# Patient Record
Sex: FEMALE | Race: WHITE
[De-identification: names, ages, dates, MRNs, and addresses within clinical notes are randomized per-mention and may not be internally consistent; named-entity substitution may affect disease eponyms.]

---

## 2018-06-12 ENCOUNTER — HOSPITAL ENCOUNTER (INPATIENT)
Dept: HOSPITAL 41 - JD.ED | Age: 83
LOS: 7 days | Discharge: HOME HEALTH | DRG: 193 | End: 2018-06-19
Payer: MEDICARE

## 2018-06-12 DIAGNOSIS — Z93.3: ICD-10-CM

## 2018-06-12 DIAGNOSIS — Z79.01: ICD-10-CM

## 2018-06-12 DIAGNOSIS — Z79.82: ICD-10-CM

## 2018-06-12 DIAGNOSIS — M19.90: ICD-10-CM

## 2018-06-12 DIAGNOSIS — E03.9: ICD-10-CM

## 2018-06-12 DIAGNOSIS — Z85.048: ICD-10-CM

## 2018-06-12 DIAGNOSIS — I11.0: ICD-10-CM

## 2018-06-12 DIAGNOSIS — Z87.01: ICD-10-CM

## 2018-06-12 DIAGNOSIS — I50.9: ICD-10-CM

## 2018-06-12 DIAGNOSIS — Z86.73: ICD-10-CM

## 2018-06-12 DIAGNOSIS — I50.32: ICD-10-CM

## 2018-06-12 DIAGNOSIS — Z88.8: ICD-10-CM

## 2018-06-12 DIAGNOSIS — J18.9: ICD-10-CM

## 2018-06-12 DIAGNOSIS — Z79.899: ICD-10-CM

## 2018-06-12 DIAGNOSIS — J44.0: ICD-10-CM

## 2018-06-12 DIAGNOSIS — R79.1: ICD-10-CM

## 2018-06-12 DIAGNOSIS — I48.2: ICD-10-CM

## 2018-06-12 DIAGNOSIS — Z77.22: ICD-10-CM

## 2018-06-12 DIAGNOSIS — Z87.891: ICD-10-CM

## 2018-06-12 DIAGNOSIS — J96.01: ICD-10-CM

## 2018-06-12 DIAGNOSIS — Z88.0: ICD-10-CM

## 2018-06-12 DIAGNOSIS — H40.9: ICD-10-CM

## 2018-06-12 DIAGNOSIS — I35.1: ICD-10-CM

## 2018-06-12 DIAGNOSIS — J12.3: Primary | ICD-10-CM

## 2018-06-12 DIAGNOSIS — Z87.440: ICD-10-CM

## 2018-06-12 PROCEDURE — 86140 C-REACTIVE PROTEIN: CPT

## 2018-06-12 PROCEDURE — 87040 BLOOD CULTURE FOR BACTERIA: CPT

## 2018-06-12 PROCEDURE — 71045 X-RAY EXAM CHEST 1 VIEW: CPT

## 2018-06-12 PROCEDURE — 85027 COMPLETE CBC AUTOMATED: CPT

## 2018-06-12 PROCEDURE — 82803 BLOOD GASES ANY COMBINATION: CPT

## 2018-06-12 PROCEDURE — 96365 THER/PROPH/DIAG IV INF INIT: CPT

## 2018-06-12 PROCEDURE — 94640 AIRWAY INHALATION TREATMENT: CPT

## 2018-06-12 PROCEDURE — 85007 BL SMEAR W/DIFF WBC COUNT: CPT

## 2018-06-12 PROCEDURE — 85610 PROTHROMBIN TIME: CPT

## 2018-06-12 PROCEDURE — 36415 COLL VENOUS BLD VENIPUNCTURE: CPT

## 2018-06-12 PROCEDURE — 36600 WITHDRAWAL OF ARTERIAL BLOOD: CPT

## 2018-06-12 PROCEDURE — 93005 ELECTROCARDIOGRAM TRACING: CPT

## 2018-06-12 PROCEDURE — 87205 SMEAR GRAM STAIN: CPT

## 2018-06-12 PROCEDURE — 84484 ASSAY OF TROPONIN QUANT: CPT

## 2018-06-12 PROCEDURE — 99285 EMERGENCY DEPT VISIT HI MDM: CPT

## 2018-06-12 PROCEDURE — 80053 COMPREHEN METABOLIC PANEL: CPT

## 2018-06-12 PROCEDURE — 83880 ASSAY OF NATRIURETIC PEPTIDE: CPT

## 2018-06-12 RX ADMIN — METOROPROLOL TARTRATE PRN MG: 5 INJECTION, SOLUTION INTRAVENOUS at 21:16

## 2018-06-12 RX ADMIN — POTASSIUM CHLORIDE SCH MEQ: 1500 TABLET, EXTENDED RELEASE ORAL at 20:54

## 2018-06-12 NOTE — PCM.HP
<Katherine Wyman - Last Filed: 06/12/18 19:26>





H&P History of Present Illness





- General


Date of Service: 06/12/18


Admit Problem/Dx: 


 Admission Diagnosis/Problem





Admission Diagnosis/Problem      Pneumonia








Source of Information: Patient, Family, Provider





- History of Present Illness


Initial Comments - Free Text/Narative: 





This is an 88 y/o female with PMHx significant for chronic afib, COPD, CHF, 

rheumatic fever as toddler, pneumonia in the past, glaucoma, recurrent UTI, 

hypothyroidism, OA, and rectal cancer with surgical intervention for which she 

now has a colostomy who comes in for worsening cough that is non productive and 

shortness of breath for the past 2 days. She was admitted from the ED. Patient 

reports subjective fever, chills, shortness of breath, wheezing, cough, chronic 

peripheral edema, difficulty walking, and weakness. She denies chest pain, 

throat pain, sputum production, abdominal pain, N/V, constipation, diarrhea, 

headache. 





Her work-up in the ED showed CBC remarkable for platelet count 151. CMP 

remarkable for Cl 108, anion gap 16.1, BUN 20, Cr 1.2, eGFR 42. CRP elevated at 

9.4. ABG performed showed pCO2 32.3, pO2 53.0, HCO3 19.7. BNP was 3935. ED also 

ordered BC x 2 which are pending. CXR performed in ED showed right lower base 

infiltrate and possible infiltrate vs atelectasis in the left base as well as 

mild pulmonary congestion. Levaquin 750 mg IV initiated in ED. 





Patient reports that she has been feeling short of breath and coughing for the 

past 2 days. Daughter is present with patient and reports that they have tried 

Vicks rub, tumeric and ginger tea with honey with no relief. They have not 

tried OTC products. Patient has been using 2 pillows at night but denies any 

PND. Daughter reports that patient has been hospitalized for pneumonia in the 

past but this has been greater than 1 year ago. Patient states she has b/l 

pedal edema but wears KURT hose and this is well controlled. Patient has a nurse 

that comes to her home 2 times per week to take her BP and O2 sat. Patient 

states that her usual O2 sat is 99%. She does not use home O2. Daughter reports 

that patient is independent at home in [place], but she does use a walker 

and cane. 





Daughter also reports that patient just finished treatment for a UTI recently. 

Daughter reports she was treated with cipro and yesterday was the last dose of 

this. 





She is subsequently admitted to the Med/Surg with Tele. She is a former smoker 

and was exposed to secondhand smoke. Code status is CPR only. PCP is Dr. Nereyda Rodriguez in [place], [place].  





   





- Related Data


Allergies/Adverse Reactions: 


 Allergies











Allergy/AdvReac Type Severity Reaction Status Date / Time


 


NSAIDS (Non-Steroidal Allergy  Other Verified 06/12/18 13:53





Anti-Inflamma     


 


Penicillins Allergy  Other Verified 06/12/18 13:53











Home Medications: 


 Home Meds





Acetaminophen/Diphenhydramine [Tylenol Pm Ex-Strength Caplet] 500 mg PO BID 06/ 12/18 [History]


Aspirin 81 mg PO DAILY 06/12/18 [History]


Benazepril HCl [Lotensin] 40 mg PO DAILY 06/12/18 [History]


Fish Oil/DHA/EPA [Fish Oil 1,200 MG] 300 mg PO DAILY 06/12/18 [History]


Furosemide [Lasix] 40 mg PO DAILY 06/12/18 [History]


Lactobacillus Acidophilus [Probiotic] 1 each PO DAILY 06/12/18 [History]


Levothyroxine [Synthroid] 88 mcg PO ACBREAKFAST 06/12/18 [History]


Melatonin [Melatin] 3 mg PO BEDTIME 06/12/18 [History]


Metoprolol Tartrate 50 mg PO BID 06/12/18 [History]


Mirtazapine 7.5 mg PO DAILY 06/12/18 [History]


Multivitamin W-Minerals/Lutein [Vision Plus Lutein Vitamin] 1 each PO BID 06/12/ 18 [History]


Potassium Chloride 20 meq PO BID 06/12/18 [History]


Tiotropium Br/Olodaterol HCl [Stiolto Respimat Inhal Spray] 4 gm IH DAILY 06/12/ 18 [History]


Warfarin [Coumadin] 1 mg PO TU 06/12/18 [History]


Warfarin [Coumadin] 2 mg PO ASDIRECTED 06/12/18 [History]


amLODIPine Besylate [Amlodipine Besylate] 10 mg PO DAILY 06/12/18 [History]











Past Medical History


HEENT History: Reports: Glaucoma, Impaired Vision, Macular Degeneration


Cardiovascular History: Reports: Afib, Heart Failure, Hypertension


Respiratory History: Reports: COPD, SOB, Other (See Below)


Other Respiratory History: borderline emphysema


Genitourinary History: Reports: UTI, Recurrent


Other Musculoskeletal History: uses cane


Neurological History: Reports: Other (See Below)


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

"
Endocrine/Metabolic History: Reports: Hypothyroidism


Hematologic History: Reports: Other (See Below)


Other Hematologic History: blood thinners





- Past Surgical History


GI Surgical History: Reports: Appendectomy, Colostomy, Other (See Below)


Other GI Surgeries/Procedures: hx rectal cancer with surgical intervention





Social & Family History





- Tobacco Use


Smoking Status *Q: Former Smoker


Used Tobacco, but Quit: No





- Caffeine Use


Caffeine Use: Reports: Coffee, Soda





- Recreational Drug Use


Recreational Drug Use: No





H&P Review of Systems





- Review of Systems:


Review Of Systems: See Below


General: Reports: Fever (subjective ), Weakness, Decreased Appetite


HEENT: Reports: No Symptoms.  Denies: Headaches


Pulmonary: Reports: Shortness of Breath, Cough.  Denies: Sputum, Hemoptysis


Cardiovascular: Reports: No Symptoms, Dyspnea on Exertion, Edema (trace, wears 

KURT hose regularly ).  Denies: Chest Pain, Palpitations, Lightheadedness


Gastrointestinal: Reports: No Symptoms.  Denies: Abdominal Pain, Constipation, 

Diarrhea, Hematochezia, Melena, Nausea


Genitourinary: Reports: No Symptoms.  Denies: Dysuria, Frequency, Burning


Musculoskeletal: Reports: No Symptoms, Joint Pain (chronic left knee pain with 

OA )


Skin: Reports: No Symptoms.  Denies: Cyanosis


Psychiatric: Reports: No Symptoms.  Denies: Confusion, Depression


Neurological: Reports: No Symptoms, Weakness (generalized ).  Denies: Confusion

, Dizziness, Headache


Hematologic/Lymphatic: Reports: No Symptoms


Immunologic: Reports: No Symptoms





Exam





- Exam


Exam: See Below





- Vital Signs


Vital Signs: 


 Last Vital Signs











Temp  98.5 F   06/12/18 13:38


 


Pulse  92   06/12/18 13:38


 


Resp  22 H  06/12/18 13:38


 


BP  156/74 H  06/12/18 13:38


 


Pulse Ox  90 L  06/12/18 16:53











Weight: 65.771 kg





- Exam


Quality Assessment: Supplemental Oxygen, DVT Prophylaxis (KURT hose )


General: Alert, Oriented, Cooperative, Mild Distress


HEENT: EOMI, Mucosa Moist & Pink, Pupils Equal


Neck: Supple, Full Range of Motion


Lungs: Crackles (b/l ), Rales (b/l ), Other (Increased respiratory effort )


Cardiovascular: Regular Rate, Regular Rhythm


GI/Abdominal Exam: Normal Bowel Sounds, Soft, Non-Tender, No Distention


 (Female) Exam: Deferred


Rectal (Female) Exam: Deferred


Back Exam: Normal Inspection, Decreased Range of Motion


Extremities: Normal Inspection, Normal Capillary Refill, Pedal Edema (trace ), 

Limited Range of Motion


Peripheral Pulses: 1+: Radial (L), Radial (R), Posterior Tibial (L), Posterior 

Tibial (R), Dorsalis Pedis (L), Dorsalis Pedis (R)


Skin: Warm, Dry, Intact


Neurological: Cranial Nerves Intact, Strength Equal Bilateral (weakness present 

)


Neuro Extensive - Mental Status: Alert, Oriented x3, Normal Mood/Affect, Normal 

Cognition, Memory Intact


Neuro Extensive - Motor, Sensory, Reflexes: CN II-XII Intact (grossly )


Psychiatric: Alert, Normal Affect, Normal Mood





- Patient Data


Lab Results Last 24 hrs: 


 Laboratory Results - last 24 hr











  06/12/18 06/12/18 06/12/18 Range/Units





  14:34 14:50 14:50 


 


WBC   6.69   (3.98-10.04)  K/mm3


 


RBC   4.84   (3.98-5.22)  M/mm3


 


Hgb   14.6   (11.2-15.7)  gm/L


 


Hct   44.1   (34.1-44.9)  %


 


MCV   91.1   (79.4-94.8)  fl


 


MCH   30.2   (25.6-32.2)  pg


 


MCHC   33.1   (32.2-35.5)  g/dl


 


RDW Std Deviation   48.2 H   (36.4-46.3)  fL


 


Plt Count   151 L   (182-369)  K/mm3


 


MPV   10.4   (9.4-12.3)  fl


 


Neutrophils % (Manual)   66 H   (40-60)  %


 


Band Neutrophils %   2   (0-10)  %


 


Lymphocytes % (Manual)   28   (20-40)  %


 


Atypical Lymphs %   0   %


 


Monocytes % (Manual)   4   (2-10)  %


 


Eosinophils % (Manual)   0 L   (0.7-5.8)  %


 


Basophils % (Manual)   0 L   (0.1-1.2)  


 


Platelet Estimate   Adequate   


 


Poikilocytosis   1+ slight   


 


Ovalocytes   1+ slight   


 


RBC Morph Comment   Not Reportable   


 


PT     (9.5-12.1)  SECONDS


 


INR     


 


Puncture Site  Rt radial    


 


ABG pH  7.40    (7.35-7.45)  


 


ABG pCO2  32.3 L    (35.0-45.0)  mmHg


 


ABG pO2  53.0 L    (80.0-100.0)  mmHg


 


ABG HCO3  19.7 L    (22.0-26.0)  meq/L


 


ABG O2 Saturation  80.8 L    (96.0-97.0)  %


 


ABG Base Excess  -3.6 L    (-2-2.0)  


 


Efrem Test  Positive    


 


A-a Gradient  112    mmHg


 


O2 Delivery Device  Nasal cannula    


 


Oxygen Flow Rate  3.0    


 


FiO2  32.00    (21..00)  %


 


Sodium    142  (136-145)  mEq/L


 


Potassium    4.1  (3.5-5.1)  mEq/L


 


Chloride    108 H  ()  mEq/L


 


Carbon Dioxide    22  (21-32)  mEq/L


 


Anion Gap    16.1 H  (5-15)  


 


BUN    20 H  (7-18)  mg/dL


 


Creatinine    1.2 H  (0.55-1.02)  mg/dL


 


Est Cr Clr Drug Dosing    30.92  mL/min


 


Estimated GFR (MDRD)    42  (>60)  mL/min


 


BUN/Creatinine Ratio    16.7  (14-18)  


 


Glucose    107  ()  mg/dL


 


Calcium    8.8  (8.5-10.1)  mg/dL


 


Total Bilirubin    0.6  (0.2-1.0)  mg/dL


 


AST    36  (15-37)  U/L


 


ALT    15  (14-59)  U/L


 


Alkaline Phosphatase    79  ()  U/L


 


Troponin I    < 0.017  (0.00-0.056)  ng/mL


 


C-Reactive Protein    9.4 H*  (<1.0)  mg/dL


 


NT-Pro-B Natriuret Pep     (0-450)  pg/mL


 


Total Protein    7.1  (6.4-8.2)  g/dl


 


Albumin    3.1 L  (3.4-5.0)  g/dl


 


Globulin    4.0  gm/dL


 


Albumin/Globulin Ratio    0.8 L  (1-2)  














  06/12/18 06/12/18 Range/Units





  14:50 15:30 


 


WBC    (3.98-10.04)  K/mm3


 


RBC    (3.98-5.22)  M/mm3


 


Hgb    (11.2-15.7)  gm/L


 


Hct    (34.1-44.9)  %


 


MCV    (79.4-94.8)  fl


 


MCH    (25.6-32.2)  pg


 


MCHC    (32.2-35.5)  g/dl


 


RDW Std Deviation    (36.4-46.3)  fL


 


Plt Count    (182-369)  K/mm3


 


MPV    (9.4-12.3)  fl


 


Neutrophils % (Manual)    (40-60)  %


 


Band Neutrophils %    (0-10)  %


 


Lymphocytes % (Manual)    (20-40)  %


 


Atypical Lymphs %    %


 


Monocytes % (Manual)    (2-10)  %


 


Eosinophils % (Manual)    (0.7-5.8)  %


 


Basophils % (Manual)    (0.1-1.2)  


 


Platelet Estimate    


 


Poikilocytosis    


 


Ovalocytes    


 


RBC Morph Comment    


 


PT   20.3 H  (9.5-12.1)  SECONDS


 


INR   1.89  


 


Puncture Site    


 


ABG pH    (7.35-7.45)  


 


ABG pCO2    (35.0-45.0)  mmHg


 


ABG pO2    (80.0-100.0)  mmHg


 


ABG HCO3    (22.0-26.0)  meq/L


 


ABG O2 Saturation    (96.0-97.0)  %


 


ABG Base Excess    (-2-2.0)  


 


Efrem Test    


 


A-a Gradient    mmHg


 


O2 Delivery Device    


 


Oxygen Flow Rate    


 


FiO2    (21..00)  %


 


Sodium    (136-145)  mEq/L


 


Potassium    (3.5-5.1)  mEq/L


 


Chloride    ()  mEq/L


 


Carbon Dioxide    (21-32)  mEq/L


 


Anion Gap    (5-15)  


 


BUN    (7-18)  mg/dL


 


Creatinine    (0.55-1.02)  mg/dL


 


Est Cr Clr Drug Dosing    mL/min


 


Estimated GFR (MDRD)    (>60)  mL/min


 


BUN/Creatinine Ratio    (14-18)  


 


Glucose    ()  mg/dL


 


Calcium    (8.5-10.1)  mg/dL


 


Total Bilirubin    (0.2-1.0)  mg/dL


 


AST    (15-37)  U/L


 


ALT    (14-59)  U/L


 


Alkaline Phosphatase    ()  U/L


 


Troponin I    (0.00-0.056)  ng/mL


 


C-Reactive Protein    (<1.0)  mg/dL


 


NT-Pro-B Natriuret Pep  3935 H   (0-450)  pg/mL


 


Total Protein    (6.4-8.2)  g/dl


 


Albumin    (3.4-5.0)  g/dl


 


Globulin    gm/dL


 


Albumin/Globulin Ratio    (1-2)  











Result Diagrams: 


 06/12/18 14:50





 06/12/18 14:50


Problem List Initiated/Reviewed/Updated: Yes


Orders Last 24hrs: 


 Active Orders 24 hr











 Category Date Time Status


 


 Patient Status [ADT] Routine ADT  06/12/18 17:21 Active


 


 EKG 12 Lead [EKG Documentation Completion] [RC] STAT Care  06/12/18 14:15 

Active


 


 Oxygen Therapy [RC] ASDIRECTED Care  06/12/18 14:16 Active


 


 Peripheral IV Care [RC] .AS DIRECTED Care  06/12/18 14:16 Active


 


 RT Aerosol Therapy [RC] ASDIRECTED Care  06/12/18 14:02 Active


 


 RT Aerosol Therapy [RC] ASDIRECTED Care  06/12/18 16:53 Active


 


 Chest 1V Frontal [CR] Stat Exams  06/12/18 14:15 Taken


 


 CULTURE BLOOD [BC] Stat Lab  06/12/18 14:50 Received


 


 CULTURE BLOOD [BC] Stat Lab  06/12/18 15:04 Received


 


 Sodium Chloride 0.9% [Saline Flush] Med  06/12/18 14:15 Active





 10 ml FLUSH ASDIRECTED PRN   


 


 Peripheral IV Insertion Pediatric [OM.PC] Routine Oth  06/12/18 14:15 Ordered








 Medication Orders





Sodium Chloride (Saline Flush)  10 ml FLUSH ASDIRECTED PRN


   PRN Reason: Keep Vein Open


   Last Admin: 06/12/18 15:05  Dose: 10 ml








Assessment/Plan Comment:: 





I/P: 





Acute:





Community Acquired Pneumonia 


   - Risk Factors: COPD, elderly, previous hospitalization for pneumonia 


   - CURB 65 Score is 2 points:  6.8-30% mortality with recommendation to 

consider inpatient or outpatient with close follow-up


   - PSI/PORT Score is 107:  8.2-9.3% mortality with hospitalization 

recommended based on risk  


   - She does not meet criteria for Sepsis/SIRS at this time 


   - currently afebrile and no leukocytosis --> does take Tylenol daily which 

could be affecting her temperature 


   - ED CXR 6/12/18 initial report:  right lower base infiltrate and possible 

infiltrate vs atelectasis in the left base as well as mild pulmonary congestion.


   - Sputum Cx, Mycoplasma pneumoniae Ag, Strep pneumoniae Ag and Respiratory 

Viral Panel


   - Patient reports some difficulty chewing/swallowing --> concern for 

possible asp pneumonia, will consult SLP and order soft diet texture for now 


   - IV Levaquin 750 mg IV daily given in ED, will change therapy to 

Azithromycin 500 mg IV day 1 then 250 mg IV x 4 days and cefotaxime 1 gm IV q 8 

hr as she has recently been on Cipro with last dose yesterday 


      -Patient reports rash with PCN previously but states that she has had 

PCNs since that episode and has not had any reactions 


   - Decongestant and Expectorant Q4H PRN 


   - Prednisone 40 mg po daily x 5 days 


   - Supplemental O2 and breathing treatments as needed 


   - Serial CXR as indicated and FV/IS as directed 


   - Recommend outpatient PFTs 





Heart Failure


   - BNP 3935 


   - Home meds include Benazepril 40 mg daily, furosemide 40 mg daily, 

metoprolol tartrate 50 mg BID


   - Patient and daughter do not think patient has ever had 2D echo --> will 

order 2D Echo; if EF =< 35%, consider addition of spironolactone 


   - Heart Healthy diet 


   - Dietitian consult 





Subtherapeutic INR


   - hx of afib


   - INR 1.89


   - she is on warfarin 1 mg q Tuesday and 2 mg daily 


   - Pharmacy to adjust warfarin 





Weakness


   - exacerbated by current illness


   - uses a walker and cane at home but is usually independent


   - PT/OT consult


 


Chronic: 


Afib


COPD


CHF


Rheumatic fever as toddler 


Hx/o Pneumonia in the past


Glaucoma


Recurrent UTI --> recent completion of treatment with last dose of cipro 

yesterday, will order UA  


Hypothyroidism


OA


Rectal cancer with surgical intervention for which she now has a colostomy





Plan: 


Admitted from ED to Med/Surg with Tele


Other orders as indicated above 


CM/SW for discharge planning 


Routine AM labs 


Will order UA with hx of recurrent UTIs and recent completion of treatment 


Continue home meds 


Heart healthy diet  


Consult dietitian 


Consult PT/OT 


Consult SLP 


DVT Prophylaxis: She is on warfarin at home and also wears KURT hose; will 

continue these inpatient  


Ambulate as tolerated 


Code Status: CPR only  


PCP: Dr. Nereyda Rodriguez in [place], [place] 





<Arlene Tenorio T - Last Filed: 06/12/18 20:42>





H&P History of Present Illness





- General


Admit Problem/Dx: 


 Admission Diagnosis/Problem





Admission Diagnosis/Problem      Pneumonia











Exam





- Vital Signs


Vital Signs: 


 Last Vital Signs











Temp  36.8 C   06/12/18 20:05


 


Pulse  91   06/12/18 20:05


 


Resp  14   06/12/18 20:05


 


BP  145/91 H  06/12/18 20:05


 


Pulse Ox  92 L  06/12/18 20:05














- Patient Data


Lab Results Last 24 hrs: 


 Laboratory Results - last 24 hr











  06/12/18 06/12/18 06/12/18 Range/Units





  14:34 14:50 14:50 


 


WBC   6.69   (3.98-10.04)  K/mm3


 


RBC   4.84   (3.98-5.22)  M/mm3


 


Hgb   14.6   (11.2-15.7)  gm/L


 


Hct   44.1   (34.1-44.9)  %


 


MCV   91.1   (79.4-94.8)  fl


 


MCH   30.2   (25.6-32.2)  pg


 


MCHC   33.1   (32.2-35.5)  g/dl


 


RDW Std Deviation   48.2 H   (36.4-46.3)  fL


 


Plt Count   151 L   (182-369)  K/mm3


 


MPV   10.4   (9.4-12.3)  fl


 


Neutrophils % (Manual)   66 H   (40-60)  %


 


Band Neutrophils %   2   (0-10)  %


 


Lymphocytes % (Manual)   28   (20-40)  %


 


Atypical Lymphs %   0   %


 


Monocytes % (Manual)   4   (2-10)  %


 


Eosinophils % (Manual)   0 L   (0.7-5.8)  %


 


Basophils % (Manual)   0 L   (0.1-1.2)  


 


Platelet Estimate   Adequate   


 


Poikilocytosis   1+ slight   


 


Ovalocytes   1+ slight   


 


RBC Morph Comment   Not Reportable   


 


PT     (9.5-12.1)  SECONDS


 


INR     


 


Puncture Site  Rt radial    


 


ABG pH  7.40    (7.35-7.45)  


 


ABG pCO2  32.3 L    (35.0-45.0)  mmHg


 


ABG pO2  53.0 L    (80.0-100.0)  mmHg


 


ABG HCO3  19.7 L    (22.0-26.0)  meq/L


 


ABG O2 Saturation  80.8 L    (96.0-97.0)  %


 


ABG Base Excess  -3.6 L    (-2-2.0)  


 


Efrem Test  Positive    


 


A-a Gradient  112    mmHg


 


O2 Delivery Device  Nasal cannula    


 


Oxygen Flow Rate  3.0    


 


FiO2  32.00    (21..00)  %


 


Sodium    142  (136-145)  mEq/L


 


Potassium    4.1  (3.5-5.1)  mEq/L


 


Chloride    108 H  ()  mEq/L


 


Carbon Dioxide    22  (21-32)  mEq/L


 


Anion Gap    16.1 H  (5-15)  


 


BUN    20 H  (7-18)  mg/dL


 


Creatinine    1.2 H  (0.55-1.02)  mg/dL


 


Est Cr Clr Drug Dosing    30.92  mL/min


 


Estimated GFR (MDRD)    42  (>60)  mL/min


 


BUN/Creatinine Ratio    16.7  (14-18)  


 


Glucose    107  ()  mg/dL


 


Calcium    8.8  (8.5-10.1)  mg/dL


 


Total Bilirubin    0.6  (0.2-1.0)  mg/dL


 


AST    36  (15-37)  U/L


 


ALT    15  (14-59)  U/L


 


Alkaline Phosphatase    79  ()  U/L


 


Troponin I    < 0.017  (0.00-0.056)  ng/mL


 


C-Reactive Protein    9.4 H*  (<1.0)  mg/dL


 


NT-Pro-B Natriuret Pep     (0-450)  pg/mL


 


Total Protein    7.1  (6.4-8.2)  g/dl


 


Albumin    3.1 L  (3.4-5.0)  g/dl


 


Globulin    4.0  gm/dL


 


Albumin/Globulin Ratio    0.8 L  (1-2)  














  06/12/18 06/12/18 Range/Units





  14:50 15:30 


 


WBC    (3.98-10.04)  K/mm3


 


RBC    (3.98-5.22)  M/mm3


 


Hgb    (11.2-15.7)  gm/L


 


Hct    (34.1-44.9)  %


 


MCV    (79.4-94.8)  fl


 


MCH    (25.6-32.2)  pg


 


MCHC    (32.2-35.5)  g/dl


 


RDW Std Deviation    (36.4-46.3)  fL


 


Plt Count    (182-369)  K/mm3


 


MPV    (9.4-12.3)  fl


 


Neutrophils % (Manual)    (40-60)  %


 


Band Neutrophils %    (0-10)  %


 


Lymphocytes % (Manual)    (20-40)  %


 


Atypical Lymphs %    %


 


Monocytes % (Manual)    (2-10)  %


 


Eosinophils % (Manual)    (0.7-5.8)  %


 


Basophils % (Manual)    (0.1-1.2)  


 


Platelet Estimate    


 


Poikilocytosis    


 


Ovalocytes    


 


RBC Morph Comment    


 


PT   20.3 H  (9.5-12.1)  SECONDS


 


INR   1.89  


 


Puncture Site    


 


ABG pH    (7.35-7.45)  


 


ABG pCO2    (35.0-45.0)  mmHg


 


ABG pO2    (80.0-100.0)  mmHg


 


ABG HCO3    (22.0-26.0)  meq/L


 


ABG O2 Saturation    (96.0-97.0)  %


 


ABG Base Excess    (-2-2.0)  


 


Efrem Test    


 


A-a Gradient    mmHg


 


O2 Delivery Device    


 


Oxygen Flow Rate    


 


FiO2    (21..00)  %


 


Sodium    (136-145)  mEq/L


 


Potassium    (3.5-5.1)  mEq/L


 


Chloride    ()  mEq/L


 


Carbon Dioxide    (21-32)  mEq/L


 


Anion Gap    (5-15)  


 


BUN    (7-18)  mg/dL


 


Creatinine    (0.55-1.02)  mg/dL


 


Est Cr Clr Drug Dosing    mL/min


 


Estimated GFR (MDRD)    (>60)  mL/min


 


BUN/Creatinine Ratio    (14-18)  


 


Glucose    ()  mg/dL


 


Calcium    (8.5-10.1)  mg/dL


 


Total Bilirubin    (0.2-1.0)  mg/dL


 


AST    (15-37)  U/L


 


ALT    (14-59)  U/L


 


Alkaline Phosphatase    ()  U/L


 


Troponin I    (0.00-0.056)  ng/mL


 


C-Reactive Protein    (<1.0)  mg/dL


 


NT-Pro-B Natriuret Pep  3935 H   (0-450)  pg/mL


 


Total Protein    (6.4-8.2)  g/dl


 


Albumin    (3.4-5.0)  g/dl


 


Globulin    gm/dL


 


Albumin/Globulin Ratio    (1-2)  











Result Diagrams: 


 06/12/18 14:50





 06/12/18 14:50


Orders Last 24hrs: 


 Active Orders 24 hr











 Category Date Time Status


 


 Patient Status [ADT] Routine ADT  06/12/18 17:21 Active


 


 Height and Weight [RC] 04 Care  06/12/18 18:24 Active


 


 Intake and Output [RC] 04,16 Care  06/12/18 18:27 Active


 


 RT Aerosol Therapy [RC] ASDIRECTED Care  06/12/18 18:28 Active


 


 VTE/DVT Education [RC] 10,22 Care  06/12/18 18:25 Active


 


 Vital Signs [RC] Q4HR Care  06/12/18 18:25 Active


 


 Consult to Dietician [CONS] Routine Cons  06/12/18 18:24 Active


 


 Consult to Spiritual Care [CONS] Routine Cons  06/12/18 18:24 Active


 


 OT Evaluation and Treatment [CONS] Routine Cons  06/12/18 18:24 Active


 


 PT Evaluation and Treatment [CONS] Routine Cons  06/12/18 18:24 Active


 


 Respiratory Care Assess and Treatment [CONS] Routine Cons  06/12/18 18:24 

Active


 


 SLP Evaluation and Treatment [CONS] Routine Cons  06/12/18 18:18 Active


 


 Sodium Restricted Diet [DIET] Diet  06/12/18 Dinner Active


 


 Soft Diet [DIET] Diet  06/13/18 Breakfast Active


 


 Chest 1V Frontal [CR] AM Exams  06/14/18 05:11 Ordered


 


 Chest 1V Frontal [CR] Stat Exams  06/12/18 14:15 Taken


 


 Echo Comp wo Cont [US] Routine Exams  06/13/18 05:11 Ordered


 


 BASIC METABOLIC PANEL,BMP [CHEM] AM Lab  06/13/18 05:11 Ordered


 


 BASIC METABOLIC PANEL,BMP [CHEM] AM Lab  06/14/18 05:11 Ordered


 


 BASIC METABOLIC PANEL,BMP [CHEM] AM Lab  06/15/18 05:11 Ordered


 


 BASIC METABOLIC PANEL,BMP [CHEM] AM Lab  06/16/18 05:11 Ordered


 


 BASIC METABOLIC PANEL,BMP [CHEM] AM Lab  06/17/18 05:11 Ordered


 


 C-REACTIVE PROTEIN [CHEM] AM Lab  06/13/18 05:11 Ordered


 


 C-REACTIVE PROTEIN [CHEM] AM Lab  06/14/18 05:11 Ordered


 


 C-REACTIVE PROTEIN [CHEM] AM Lab  06/15/18 05:11 Ordered


 


 C-REACTIVE PROTEIN [CHEM] AM Lab  06/16/18 05:11 Ordered


 


 C-REACTIVE PROTEIN [CHEM] AM Lab  06/17/18 05:11 Ordered


 


 CBC WITH AUTO DIFF [HEME] AM Lab  06/13/18 05:11 Ordered


 


 CBC WITH AUTO DIFF [HEME] AM Lab  06/14/18 05:11 Ordered


 


 CBC WITH AUTO DIFF [HEME] AM Lab  06/15/18 05:11 Ordered


 


 CBC WITH AUTO DIFF [HEME] AM Lab  06/16/18 05:11 Ordered


 


 CBC WITH AUTO DIFF [HEME] AM Lab  06/17/18 05:11 Ordered


 


 CULTURE BLOOD [BC] Stat Lab  06/12/18 14:50 Received


 


 CULTURE BLOOD [BC] Stat Lab  06/12/18 15:04 Received


 


 CULTURE SPUTUM + SMEAR [RM] Routine Lab  06/12/18 19:00 Ordered


 


 INR,PT,PROTHROMBIN TIME [COAG] DAILY Lab  06/13/18 04:00 Ordered


 


 INR,PT,PROTHROMBIN TIME [COAG] DAILY Lab  06/14/18 04:00 Ordered


 


 INR,PT,PROTHROMBIN TIME [COAG] DAILY Lab  06/15/18 04:00 Ordered


 


 INR,PT,PROTHROMBIN TIME [COAG] DAILY Lab  06/16/18 04:00 Ordered


 


 INR,PT,PROTHROMBIN TIME [COAG] DAILY Lab  06/17/18 04:00 Ordered


 


 INR,PT,PROTHROMBIN TIME [COAG] DAILY Lab  06/18/18 04:00 Ordered


 


 INR,PT,PROTHROMBIN TIME [COAG] DAILY Lab  06/19/18 04:00 Ordered


 


 INR,PT,PROTHROMBIN TIME [COAG] DAILY Lab  06/20/18 04:00 Ordered


 


 INR,PT,PROTHROMBIN TIME [COAG] DAILY Lab  06/21/18 04:00 Ordered


 


 INR,PT,PROTHROMBIN TIME [COAG] DAILY Lab  06/22/18 04:00 Ordered


 


 MAGNESIUM [CHEM] AM Lab  06/13/18 05:11 Ordered


 


 MAGNESIUM [CHEM] AM Lab  06/14/18 05:11 Ordered


 


 MAGNESIUM [CHEM] AM Lab  06/15/18 05:11 Ordered


 


 MAGNESIUM [CHEM] AM Lab  06/16/18 05:11 Ordered


 


 MAGNESIUM [CHEM] AM Lab  06/17/18 05:11 Ordered


 


 METH-RESIST S.AUR,MRSA BY PCR [MOLEC] Routine Lab  06/13/18 05:11 Ordered


 


 MYCOPLASMA PNEUMONIAE IGM AB [CHEM] Routine Lab  06/13/18 05:11 Ordered


 


 RESPIRATORY PANEL BY PCR [MREF] Routine Lab  06/12/18 19:00 Ordered


 


 STREP PNEUMONIAE ANTIGEN [MREF] Routine Lab  06/12/18 19:00 Ordered


 


 UA W/MICROSCOPIC [URIN] Routine Lab  06/12/18 18:24 Ordered


 


 Acetaminophen [Tylenol] Med  06/12/18 18:24 Active





 650 mg PO Q4H PRN   


 


 Acetaminophen/oxyCODONE [Percocet 325-5 MG] Med  06/12/18 18:24 Active





 1 tab PO Q4H PRN   


 


 Albuterol/Ipratropium [DuoNeb 3.0-0.5 MG/3 ML] Med  06/12/18 18:24 Active





 3 ml NEB Q4H PRN   


 


 Aspirin Med  06/13/18 09:00 Active





 81 mg PO DAILY   


 


 Azithromycin [Zithromax] 250 mg Med  06/14/18 09:00 Active





 Sodium Chloride 0.9% [Normal Saline] 250 ml   





 IV Q24H   


 


 Azithromycin [Zithromax] 500 mg Med  06/13/18 09:00 Active





 Sodium Chloride 0.9% [Normal Saline] 250 ml   





 IV ONETIME   


 


 Benazepril [Lotensin] Med  06/13/18 09:00 Active





 40 mg PO DAILY   


 


 Benzonatate [Tessalon Perles] Med  06/12/18 18:47 Active





 100 mg PO TID PRN   


 


 Bisacodyl [Dulcolax] Med  06/12/18 18:24 Active





 5 mg PO DAILY PRN   


 


 Cefotaxime [Claforan] 1 gm Med  06/13/18 09:00 Active





 Sodium Chloride 0.9% [Normal Saline] 50 ml   





 IV Q8H   


 


 Dextromethorphan/guaiFENesin [Robitussin DM] Med  06/12/18 18:47 Active





 5 ml PO Q4H PRN   


 


 Docusate Sodium [Colace] Med  06/12/18 18:24 Active





 100 mg PO BID PRN   


 


 Docusate Sodium/Sennosides [Senna Plus] Med  06/12/18 18:24 Active





 1 tab PO BID PRN   


 


 Furosemide [Lasix] Med  06/13/18 09:00 Active





 40 mg PO DAILY   


 


 HYDROmorphone [Dilaudid] Med  06/12/18 18:24 Active





 0.25 mg IVPUSH Q2H PRN   


 


 Levothyroxine [Synthroid] Med  06/13/18 06:00 Active





 88 mcg PO ACBREAKFAST   


 


 Magnesium Hydroxide [Milk of Magnesia] Med  06/12/18 18:24 Active





 30 ml PO Q12H PRN   


 


 Melatonin Med  06/12/18 21:00 Pending





 3 mg PO BEDTIME   


 


 Metoprolol Tartrate [Lopressor] Med  06/12/18 20:32 Active





 5 mg IVPUSH Q4H PRN   


 


 Metoprolol Tartrate [Lopressor] Med  06/12/18 21:00 Active





 50 mg PO BID   


 


 Mirtazapine [Remeron] Med  06/13/18 09:00 Active





 15 mg PO DAILY   


 


 Multivitamins/Min/FA/Lut/Zeax [ICaps MV] Med  06/13/18 09:00 Active





 1 tab PO DAILY   


 


 Polyethylene Glycol 3350 [MiraLAX] Med  06/12/18 18:24 Active





 17 gm PO DAILY PRN   


 


 Potassium Chloride [Klor-Con M20] Med  06/12/18 19:30 Active





 20 meq PO BIDMEALS   


 


 Promethazine [Phenergan] Med  06/12/18 18:24 Active





 25 mg PO Q6H PRN   


 


 Promethazine [Phenergan] 6.25 mg Med  06/12/18 18:24 Active





 Sodium Chloride 0.9% [Normal Saline] 50 ml   





 IV Q6H   


 


 Saccharomyces Boulardii [Florastor] Med  06/13/18 09:00 Active





 250 mg PO DAILY   


 


 Sodium Chloride 0.9% [Saline Flush] Med  06/12/18 14:15 Active





 10 ml FLUSH ASDIRECTED PRN   


 


 Temazepam [Restoril] Med  06/12/18 18:24 Active





 7.5 mg PO BEDTIME PRN   


 


 Warfarin Pharmacy to Dose [Pharmacy to Dose - Warfarin] Med  06/12/18 19:00 

Pending





 1 dose .XX ASDIRECTED   


 


 amLODIPine [Norvasc] Med  06/13/18 09:00 Active





 10 mg PO DAILY   


 


 hydrALAZINE [Apresoline] Med  06/12/18 20:33 Active





 10 mg IVPUSH Q4H PRN   


 


 predniSONE Med  06/12/18 19:15 Active





 40 mg PO DAILY   


 


 Antiembolic Hose [OM.PC] Per Unit Routine Oth  06/12/18 18:27 Ordered


 


 Peripheral IV Insertion Pediatric [OM.PC] Routine Oth  06/12/18 14:15 Ordered


 


 RT Acapella [RESPCARE] Routine Oth  06/12/18 18:42 Active


 


 Sequential Compression Device [OM.PC] Per Unit Routine Oth  06/12/18 18:27 

Ordered


 


 Resuscitation Status Routine Resus Stat  06/12/18 18:34 Ordered








 Medication Orders





Acetaminophen (Tylenol)  650 mg PO Q4H PRN


   PRN Reason: Pain (Mild 1-3)/fever


Albuterol/Ipratropium (Duoneb 3.0-0.5 Mg/3 Ml)  3 ml NEB Q4H PRN


   PRN Reason: Shortness Of Breath/wheezing


   Last Admin: 06/12/18 20:03  Dose: 3 ml


Amlodipine Besylate (Norvasc)  10 mg PO DAILY [facility]


Aspirin (Aspirin)  81 mg PO DAILY [facility]


Benazepril HCl (Lotensin)  40 mg PO DAILY [facility]


Benzonatate (Tessalon Perles)  100 mg PO TID PRN


   PRN Reason: Cough


Bisacodyl (Dulcolax)  5 mg PO DAILY PRN


   PRN Reason: Constipation


Docusate Sodium (Colace)  100 mg PO BID PRN


   PRN Reason: Constipation


Furosemide (Lasix)  40 mg PO DAILY [facility]


Guaifenesin/Phenylephrine HCl (Robitussin Dm)  5 ml PO Q4H PRN


   PRN Reason: Cough


Hydralazine HCl (Apresoline)  10 mg IVPUSH Q4H PRN


   PRN Reason: Hypertension


Hydromorphone HCl (Dilaudid)  0.25 mg IVPUSH Q2H PRN


   PRN Reason: Pain (severe 7-10)


Promethazine HCl 6.25 mg/ (Sodium Chloride)  50.25 mls @ 100 mls/hr IV Q6H PRN


   PRN Reason: Nausea/Vomiting


Cefotaxime Sodium 1 gm/ Sodium (Chloride)  50 mls @ 100 mls/hr IV Q8H [facility]


   Stop: 06/18/18 09:01


Azithromycin 250 mg/ Sodium (Chloride)  250 mls @ 250 mls/hr IV Q24H [facility]


   Stop: 06/18/18 09:01


Azithromycin 500 mg/ Sodium (Chloride)  250 mls @ 250 mls/hr IV ONETIME ONE


   Stop: 06/13/18 09:59


Levothyroxine Sodium (Synthroid)  88 mcg PO ACBREAKFAST [facility]


Magnesium Hydroxide (Milk Of Magnesia)  30 ml PO Q12H PRN


   PRN Reason: Constipation


Metoprolol Tartrate (Lopressor)  50 mg PO BID [facility]


Metoprolol Tartrate (Lopressor)  5 mg IVPUSH Q4H PRN


   PRN Reason: Tachycardia


Mirtazapine (Remeron)  15 mg PO DAILY [facility]


Non-Formulary Medication (Melatonin)  3 mg PO BEDTIME [facility]


Oxycodone/Acetaminophen (Percocet 325-5 Mg)  1 tab PO Q4H PRN


   PRN Reason: Pain (moderate 4-6)


Polyethylene Glycol (Miralax)  17 gm PO DAILY PRN


   PRN Reason: Constipation


Potassium Chloride (Klor-Con M20)  20 meq PO BIDMEALS [facility]


Prednisone (Prednisone)  40 mg PO DAILY [facility]


   Stop: 06/17/18 19:16


Promethazine HCl (Phenergan)  25 mg PO Q6H PRN


   PRN Reason: Nausea/Vomiting


Saccharomyces Boulardii (Florastor)  250 mg PO DAILY [facility]


Senna/Docusate Sodium (Senna Plus)  1 tab PO BID PRN


   PRN Reason: Constipation


Sodium Chloride (Saline Flush)  10 ml FLUSH ASDIRECTED PRN


   PRN Reason: Keep Vein Open


   Last Admin: 06/12/18 15:05  Dose: 10 ml


Temazepam (Restoril)  7.5 mg PO BEDTIME PRN


   PRN Reason: Sleep


Vit A/Vit C/Vit E/Selen/Cu/Zn/Lutei (Icaps Mv)  1 tab PO DAILY [facility]


Warfarin Sodium (Pharmacy To Dose - Warfarin)  1 dose .XX ASDIRECTED [facility]








Assessment/Plan Comment:: 


Patient was seen and examined at bedside in concert with the PA student. The 

assessment and plans were discussed and agreed upon with me. Any changes or 

further treatment will be based on the patient's course.

## 2018-06-12 NOTE — EDM.PDOC
<Panfilo Champion - Last Filed: 06/12/18 15:10>





ED HPI GENERAL MEDICAL PROBLEM





- General


Chief Complaint: Respiratory Problem


Stated Complaint: SOB


Time Seen by Provider: 06/12/18 14:01


Source of Information: Reports: Patient, Family, RN Notes Reviewed (Daughter)





- History of Present Illness


INITIAL COMMENTS - FREE TEXT/NARRATIVE: 





87-year-old female is brought in by daughter for private vehicle for evaluation 

of worsening cough. She does have history of chronic atrial fib, COPD, 

congestive heart failure and has had pneumonia in the past. The cough has been 

worsening over the past 2 days. It has been nonproductive. Patient states I can'

t get anything up. No chest pain. She does have shortness of breath with even 

mild exertion. No abdominal pain nausea or vomiting. She believes that she has 

had some low-grade fever and also has had some chills. When asked about being 

short of breath she replies "I am always short of breath".





- Related Data


 Allergies











Allergy/AdvReac Type Severity Reaction Status Date / Time


 


NSAIDS (Non-Steroidal Allergy  Other Verified 06/12/18 13:53





Anti-Inflamma     


 


Penicillins Allergy  Other Verified 06/12/18 13:53











Home Meds: 


 Home Meds





Acetaminophen/Diphenhydramine [Tylenol Pm Ex-Strength Caplet] 1 each PO BID 06/ 12/18 [History]


Aspirin 81 mg PO DAILY 06/12/18 [History]


Benazepril HCl [Lotensin] 40 mg PO DAILY 06/12/18 [History]


Fish Oil/DHA/EPA [Fish Oil 1,200 MG] 300 mg PO DAILY 06/12/18 [History]


Furosemide [Lasix] 40 mg PO DAILY 06/12/18 [History]


Lactobacillus Acidophilus [Probiotic] 1 each PO DAILY 06/12/18 [History]


Levothyroxine [Synthroid] 88 mcg PO ACBREAKFAST 06/12/18 [History]


Melatonin [Melatin] 3 mg PO BEDTIME 06/12/18 [History]


Metoprolol Tartrate 50 mg PO BID 06/12/18 [History]


Mirtazapine 7.5 mg PO DAILY 06/12/18 [History]


Multivitamin W-Minerals/Lutein [Vision Plus Lutein Vitamin] 1 each PO DAILY 06/ 12/18 [History]


Potassium Chloride 20 meq PO BID 06/12/18 [History]


Tiotropium Br/Olodaterol HCl [Stiolto Respimat Inhal Spray] 4 gm IH DAILY 06/12/ 18 [History]


Warfarin [Coumadin] 1 mg PO TU 06/12/18 [History]


Warfarin [Coumadin] 2 mg PO ASDIRECTED 06/12/18 [History]


amLODIPine Besylate [Amlodipine Besylate] 10 mg PO DAILY 06/12/18 [History]











Past Medical History


HEENT History: Reports: Glaucoma, Impaired Vision, Macular Degeneration


Cardiovascular History: Reports: Afib, Heart Failure, Hypertension


Respiratory History: Reports: COPD, SOB, Other (See Below)


Other Respiratory History: borderline emphysema


Genitourinary History: Reports: UTI, Recurrent


Other Musculoskeletal History: uses cane


Neurological History: Reports: Other (See Below)


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

"
Endocrine/Metabolic History: Reports: Hypothyroidism


Hematologic History: Reports: Other (See Below)


Other Hematologic History: blood thinners





- Past Surgical History


GI Surgical History: Reports: Appendectomy, Colostomy, Other (See Below)


Other GI Surgeries/Procedures: hx rectal cancer with surgical intervention





Social & Family History





- Tobacco Use


Smoking Status *Q: Former Smoker


Used Tobacco, but Quit: No





- Caffeine Use


Caffeine Use: Reports: Coffee, Soda





- Recreational Drug Use


Recreational Drug Use: No





ED ROS GENERAL





- Review of Systems


Review Of Systems: See Below


Constitutional: Reports: Fever, Chills


HEENT: Denies: Sinus Problem, Throat Pain


Respiratory: Reports: Shortness of Breath, Wheezing, Cough.  Denies: Sputum


Cardiovascular: Reports: Edema (Peripheral leg, chronic).  Denies: Chest Pain


GI/Abdominal: Denies: Abdominal Pain, Nausea, Vomiting


Musculoskeletal: Reports: No Symptoms.  Denies: Shoulder Pain, Arm Pain, Leg 

Pain


Skin: Reports: No Symptoms


Neurological: Reports: Dizziness, Difficulty Walking, Weakness.  Denies: 

Trouble Speaking (Generalized)





ED EXAM, GENERAL





- Physical Exam


Exam: See Below


General Appearance: Alert, Mild Distress (Appears moderately short of breath)


Eye Exam: Bilateral Eye: PERRL


Throat/Mouth: Normal Inspection


Head: Atraumatic


Neck: Supple, Full Range of Motion


Respiratory/Chest: Respiratory Distress (Moderate tachypnea), Rhonchi (Mild 

bilateral mild bilateral), Wheezing


Cardiovascular: Regular Rate, Rhythm


GI/Abdominal: Soft, Non-Tender, Other (Colostomy lower abdomen).  No: Guarding


Back Exam: No: CVA Tenderness (L), CVA Tenderness (R)


Extremities: Normal Inspection, Pedal Edema.  No: Leg Pain (Very mild bilateral)


Neurological: Alert, No Motor/Sensory Deficits


Skin Exam: Warm, Dry, Normal Color





Course





- Vital Signs


Last Recorded V/S: 


 Last Vital Signs











Temp  36.9 C   06/12/18 13:38


 


Pulse  92   06/12/18 13:38


 


Resp  22 H  06/12/18 13:38


 


BP  156/74 H  06/12/18 13:38


 


Pulse Ox  90 L  06/12/18 16:53














- Orders/Labs/Meds


Orders: 


 Active Orders 24 hr











 Category Date Time Status


 


 EKG 12 Lead [EKG Documentation Completion] [RC] STAT Care  06/12/18 14:15 

Active


 


 Oxygen Therapy [RC] ASDIRECTED Care  06/12/18 14:16 Active


 


 Peripheral IV Care [RC] .AS DIRECTED Care  06/12/18 14:16 Active


 


 RT Aerosol Therapy [RC] ASDIRECTED Care  06/12/18 14:02 Active


 


 RT Aerosol Therapy [RC] ASDIRECTED Care  06/12/18 16:53 Active


 


 Chest 1V Frontal [CR] Stat Exams  06/12/18 14:15 Taken


 


 CULTURE BLOOD [BC] Stat Lab  06/12/18 14:50 Received


 


 CULTURE BLOOD [BC] Stat Lab  06/12/18 15:04 Received


 


 Sodium Chloride 0.9% [Saline Flush] Med  06/12/18 14:15 Active





 10 ml FLUSH ASDIRECTED PRN   


 


 Peripheral IV Insertion Pediatric [OM.PC] Routine Oth  06/12/18 14:15 Ordered








 Medication Orders





Sodium Chloride (Saline Flush)  10 ml FLUSH ASDIRECTED PRN


   PRN Reason: Keep Vein Open


   Last Admin: 06/12/18 15:05  Dose: 10 ml








Labs: 


 Laboratory Tests











  06/12/18 06/12/18 06/12/18 Range/Units





  14:34 14:50 14:50 


 


WBC   6.69   (3.98-10.04)  K/mm3


 


RBC   4.84   (3.98-5.22)  M/mm3


 


Hgb   14.6   (11.2-15.7)  gm/L


 


Hct   44.1   (34.1-44.9)  %


 


MCV   91.1   (79.4-94.8)  fl


 


MCH   30.2   (25.6-32.2)  pg


 


MCHC   33.1   (32.2-35.5)  g/dl


 


RDW Std Deviation   48.2 H   (36.4-46.3)  fL


 


Plt Count   151 L   (182-369)  K/mm3


 


MPV   10.4   (9.4-12.3)  fl


 


Neutrophils % (Manual)   66 H   (40-60)  %


 


Band Neutrophils %   2   (0-10)  %


 


Lymphocytes % (Manual)   28   (20-40)  %


 


Atypical Lymphs %   0   %


 


Monocytes % (Manual)   4   (2-10)  %


 


Eosinophils % (Manual)   0 L   (0.7-5.8)  %


 


Basophils % (Manual)   0 L   (0.1-1.2)  


 


Platelet Estimate   Adequate   


 


Poikilocytosis   1+ slight   


 


Ovalocytes   1+ slight   


 


RBC Morph Comment   Not Reportable   


 


PT     (9.5-12.1)  SECONDS


 


INR     


 


Puncture Site  Rt radial    


 


ABG pH  7.40    (7.35-7.45)  


 


ABG pCO2  32.3 L    (35.0-45.0)  mmHg


 


ABG pO2  53.0 L    (80.0-100.0)  mmHg


 


ABG HCO3  19.7 L    (22.0-26.0)  meq/L


 


ABG O2 Saturation  80.8 L    (96.0-97.0)  %


 


ABG Base Excess  -3.6 L    (-2-2.0)  


 


Efrem Test  Positive    


 


A-a Gradient  112    mmHg


 


O2 Delivery Device  Nasal cannula    


 


Oxygen Flow Rate  3.0    


 


FiO2  32.00    (21..00)  %


 


Sodium    142  (136-145)  mEq/L


 


Potassium    4.1  (3.5-5.1)  mEq/L


 


Chloride    108 H  ()  mEq/L


 


Carbon Dioxide    22  (21-32)  mEq/L


 


Anion Gap    16.1 H  (5-15)  


 


BUN    20 H  (7-18)  mg/dL


 


Creatinine    1.2 H  (0.55-1.02)  mg/dL


 


Est Cr Clr Drug Dosing    30.92  mL/min


 


Estimated GFR (MDRD)    42  (>60)  mL/min


 


BUN/Creatinine Ratio    16.7  (14-18)  


 


Glucose    107  ()  mg/dL


 


Calcium    8.8  (8.5-10.1)  mg/dL


 


Total Bilirubin    0.6  (0.2-1.0)  mg/dL


 


AST    36  (15-37)  U/L


 


ALT    15  (14-59)  U/L


 


Alkaline Phosphatase    79  ()  U/L


 


Troponin I    < 0.017  (0.00-0.056)  ng/mL


 


C-Reactive Protein    9.4 H*  (<1.0)  mg/dL


 


NT-Pro-B Natriuret Pep     (0-450)  pg/mL


 


Total Protein    7.1  (6.4-8.2)  g/dl


 


Albumin    3.1 L  (3.4-5.0)  g/dl


 


Globulin    4.0  gm/dL


 


Albumin/Globulin Ratio    0.8 L  (1-2)  














  06/12/18 06/12/18 Range/Units





  14:50 15:30 


 


WBC    (3.98-10.04)  K/mm3


 


RBC    (3.98-5.22)  M/mm3


 


Hgb    (11.2-15.7)  gm/L


 


Hct    (34.1-44.9)  %


 


MCV    (79.4-94.8)  fl


 


MCH    (25.6-32.2)  pg


 


MCHC    (32.2-35.5)  g/dl


 


RDW Std Deviation    (36.4-46.3)  fL


 


Plt Count    (182-369)  K/mm3


 


MPV    (9.4-12.3)  fl


 


Neutrophils % (Manual)    (40-60)  %


 


Band Neutrophils %    (0-10)  %


 


Lymphocytes % (Manual)    (20-40)  %


 


Atypical Lymphs %    %


 


Monocytes % (Manual)    (2-10)  %


 


Eosinophils % (Manual)    (0.7-5.8)  %


 


Basophils % (Manual)    (0.1-1.2)  


 


Platelet Estimate    


 


Poikilocytosis    


 


Ovalocytes    


 


RBC Morph Comment    


 


PT   20.3 H  (9.5-12.1)  SECONDS


 


INR   1.89  


 


Puncture Site    


 


ABG pH    (7.35-7.45)  


 


ABG pCO2    (35.0-45.0)  mmHg


 


ABG pO2    (80.0-100.0)  mmHg


 


ABG HCO3    (22.0-26.0)  meq/L


 


ABG O2 Saturation    (96.0-97.0)  %


 


ABG Base Excess    (-2-2.0)  


 


Efrem Test    


 


A-a Gradient    mmHg


 


O2 Delivery Device    


 


Oxygen Flow Rate    


 


FiO2    (21..00)  %


 


Sodium    (136-145)  mEq/L


 


Potassium    (3.5-5.1)  mEq/L


 


Chloride    ()  mEq/L


 


Carbon Dioxide    (21-32)  mEq/L


 


Anion Gap    (5-15)  


 


BUN    (7-18)  mg/dL


 


Creatinine    (0.55-1.02)  mg/dL


 


Est Cr Clr Drug Dosing    mL/min


 


Estimated GFR (MDRD)    (>60)  mL/min


 


BUN/Creatinine Ratio    (14-18)  


 


Glucose    ()  mg/dL


 


Calcium    (8.5-10.1)  mg/dL


 


Total Bilirubin    (0.2-1.0)  mg/dL


 


AST    (15-37)  U/L


 


ALT    (14-59)  U/L


 


Alkaline Phosphatase    ()  U/L


 


Troponin I    (0.00-0.056)  ng/mL


 


C-Reactive Protein    (<1.0)  mg/dL


 


NT-Pro-B Natriuret Pep  3935 H   (0-450)  pg/mL


 


Total Protein    (6.4-8.2)  g/dl


 


Albumin    (3.4-5.0)  g/dl


 


Globulin    gm/dL


 


Albumin/Globulin Ratio    (1-2)  











Meds: 


Medications











Generic Name Dose Route Start Last Admin





  Trade Name Freq  PRN Reason Stop Dose Admin


 


Sodium Chloride  10 ml  06/12/18 14:15  06/12/18 15:05





  Saline Flush  FLUSH   10 ml





  ASDIRECTED PRN   Administration





  Keep Vein Open   





     





     





     














Discontinued Medications














Generic Name Dose Route Start Last Admin





  Trade Name Freq  PRN Reason Stop Dose Admin


 


Albuterol/Ipratropium  3 ml  06/12/18 14:02  06/12/18 14:14





  Duoneb 3.0-0.5 Mg/3 Ml  NEB  06/12/18 14:03  3 ml





  ONETIME ONE   Administration





     





     





     





     


 


Albuterol/Ipratropium  3 ml  06/12/18 16:53  06/12/18 17:04





  Duoneb 3.0-0.5 Mg/3 Ml  NEB  06/12/18 16:54  3 ml





  ONETIME ONE   Administration





     





     





     





     


 


Levofloxacin/Dextrose 750 mg/  150 mls @ 100 mls/hr  06/12/18 14:56  06/12/18 15

:40





  Premix  IV  06/12/18 16:25  100 mls/hr





  ONETIME ONE   Administration





     





     





     





     














- Re-Assessments/Exams


Free Text/Narrative Re-Assessment/Exam: 





06/12/18 14:57


Blood gases on 3 L nasal cannula show that she is not retaining, PO2 low at 53. 

PCO2 also low. Have increased her O2 up to 5 L nasal cannula. Chest x-ray does 

show infiltrate right lower base and possible infiltrate versus atelectasis 

left base, mild pulmonary congestion. We'll get a second blood culture and 

start Levaquin 750 mg IV


06/12/18 15:11. Change of shift, will transfer care to Dr. LEOPOLDO Champion at this 

time








Departure





- Departure


Disposition: Admitted As Inpatient 66


Clinical Impression: 


 Acute hypoxemic respiratory failure





Pneumonia


Qualifiers:


 Pneumonia type: due to unspecified organism Laterality: right Lung location: 

lower lobe of lung Qualified Code(s): J18.1 - Lobar pneumonia, unspecified 

organism








- Discharge Information





- My Orders


Last 24 Hours: 


My Active Orders





06/12/18 16:53


RT Aerosol Therapy [RC] ASDIRECTED 














- Assessment/Plan


Last 24 Hours: 


My Active Orders





06/12/18 16:53


RT Aerosol Therapy [RC] ASDIRECTED 














<Sandy Champion - Last Filed: 06/12/18 17:55>





Course





- Re-Assessments/Exams


Free Text/Narrative Re-Assessment/Exam: 








06/12/18 16:08


Labs show elevated BNP but she is not peripherally volume overloaded. CRP 

elevated. Electrolytes normal.  I reevaluated her, she states she feels fine. 

Not sure if the neb helped. No distress. Discussed with Dr. Tenorio who agrees 

to admit her.








Departure





- Departure


Time of Disposition: 16:11





- My Orders


Last 24 Hours: 


My Active Orders





06/12/18 16:53


RT Aerosol Therapy [RC] ASDIRECTED 














- Assessment/Plan


Last 24 Hours: 


My Active Orders





06/12/18 16:53


RT Aerosol Therapy [RC] ASDIRECTED

## 2018-06-13 RX ADMIN — METHYLPREDNISOLONE SODIUM SUCCINATE SCH MG: 125 INJECTION, POWDER, FOR SOLUTION INTRAMUSCULAR; INTRAVENOUS at 18:09

## 2018-06-13 RX ADMIN — Medication SCH EACH: at 11:26

## 2018-06-13 RX ADMIN — METOROPROLOL TARTRATE PRN MG: 5 INJECTION, SOLUTION INTRAVENOUS at 09:39

## 2018-06-13 RX ADMIN — Medication SCH TAB: at 11:49

## 2018-06-13 RX ADMIN — POTASSIUM CHLORIDE SCH MEQ: 1500 TABLET, EXTENDED RELEASE ORAL at 18:08

## 2018-06-13 RX ADMIN — IPRATROPIUM BROMIDE SCH MG: 0.5 SOLUTION RESPIRATORY (INHALATION) at 15:43

## 2018-06-13 RX ADMIN — METHYLPREDNISOLONE SODIUM SUCCINATE SCH MG: 125 INJECTION, POWDER, FOR SOLUTION INTRAMUSCULAR; INTRAVENOUS at 11:54

## 2018-06-13 RX ADMIN — Medication SCH: at 09:23

## 2018-06-13 RX ADMIN — Medication SCH MG: at 11:48

## 2018-06-13 RX ADMIN — IPRATROPIUM BROMIDE SCH MG: 0.5 SOLUTION RESPIRATORY (INHALATION) at 20:03

## 2018-06-13 RX ADMIN — POTASSIUM CHLORIDE SCH MEQ: 1500 TABLET, EXTENDED RELEASE ORAL at 06:43

## 2018-06-13 NOTE — CR
Chest: Portable view of the chest was obtained.

 

Comparison: No prior chest x-ray.

 

Heart is enlarged.  Tortuous thoracic aorta is seen with 

atherosclerotic calcification.  Parenchymal density is noted within 

the right mid and lower lung raising the possibility of pneumonia.  

Mild atelectasis is seen within the left base.  Mild scoliosis and 

degenerative change is seen within the spine.

 

Impression:

1.  Possible right-sided pneumonia.

2.  Cardiomegaly and other incidental findings.

 

Diagnostic code #3

## 2018-06-13 NOTE — PCM.PN
<Noemi Carlton - Last Filed: 06/13/18 14:49>





- General Info


Date of Service: 06/13/18


Admission Dx/Problem (Free Text): 


 Admission Diagnosis/Problem





Admission Diagnosis/Problem      Pneumonia








Subjective Update: 


In to see Kiesha today. She is sitting in a chair visiting with her daughter. 

Overall she is doing well. No complaints, pain is controlled. She is still 

slightly SOB, but she states this is her baseline. Ambulating with assistance. 

Per nursing, she has not been using her walker correctly at home and does 

struggle to get to the restroom without assistance. Per nursing, her vision is 

also very poor which makes moving without assistance a hazard. PT/OT evaluation 

has been ordered- she may benefit from rehabilitation. Appetite improving. 

Urinating- per pt she does have a little incontinence at times. No other 

concerns from nursing. 


Functional Status: Reports: Pain Controlled, Tolerating Diet, Ambulating, 

Urinating, Incentive Spirometry (and Acapella)





- Review of Systems


General: Reports: No Symptoms.  Denies: Fever, Chills


HEENT: Reports: Glasses, Other (Poor vision bilaterally- h/o cataracts and 

glaucoma, hard of hearing)


Pulmonary: Reports: Shortness of Breath, Cough, Wheezing.  Denies: Sputum


Cardiovascular: Reports: Dyspnea on Exertion, Orthopnea (recently with this 

illness), Edema (minimal, wears compression hose at home).  Denies: Chest Pain, 

Palpitations, Lightheadedness


Gastrointestinal: Reports: Decreased Appetite (States she doesn't feel hungry 

most of the time).  Denies: Abdominal Pain, Constipation, Diarrhea, Nausea, 

Vomiting


Genitourinary: Reports: No Symptoms.  Denies: Dysuria, Frequency, Burning, Pain

, Urgency, Incontinence


Musculoskeletal: Reports: No Symptoms


Skin: Reports: No Symptoms


Neurological: Reports: Difficulty Walking (due to knee issues, uses walker and 

cane at home), Weakness


Psychiatric: Reports: No Symptoms





- Patient Data


Vitals - Most Recent: 


 Last Vital Signs











Temp  97.5 F   06/13/18 08:14


 


Pulse  83   06/13/18 11:53


 


Resp  22 H  06/13/18 11:53


 


BP  124/77   06/13/18 11:54


 


Pulse Ox  92 L  06/13/18 11:53











Weight - Most Recent: 66.026 kg


I&O - Last 24 Hours: 


 Intake & Output











 06/12/18 06/13/18 06/13/18





 22:59 06:59 14:59


 


Intake Total 440 600 


 


Output Total  400 


 


Balance 440 200 











Lab Results Last 24 Hours: 


 Laboratory Results - last 24 hr











  06/12/18 06/12/18 06/12/18 Range/Units





  14:34 14:50 14:50 


 


WBC   6.69   (3.98-10.04)  K/mm3


 


RBC   4.84   (3.98-5.22)  M/mm3


 


Hgb   14.6   (11.2-15.7)  gm/L


 


Hct   44.1   (34.1-44.9)  %


 


MCV   91.1   (79.4-94.8)  fl


 


MCH   30.2   (25.6-32.2)  pg


 


MCHC   33.1   (32.2-35.5)  g/dl


 


RDW Std Deviation   48.2 H   (36.4-46.3)  fL


 


Plt Count   151 L   (182-369)  K/mm3


 


MPV   10.4   (9.4-12.3)  fl


 


Neut % (Auto)     (34.0-71.1)  %


 


Lymph % (Auto)     (19.3-51.7)  %


 


Mono % (Auto)     (4.7-12.5)  %


 


Eos % (Auto)     (0.7-5.8)  


 


Baso % (Auto)     (0.1-1.2)  %


 


Neut # (Auto)     (1.56-6.13)  K/mm3


 


Lymph # (Auto)     (1.18-3.74)  K/mm3


 


Mono # (Auto)     (0.24-0.36)  K/mm3


 


Eos # (Auto)     (0.04-0.36)  K/mm3


 


Baso # (Auto)     (0.01-0.08)  K/mm3


 


Neutrophils % (Manual)   66 H   (40-60)  %


 


Band Neutrophils %   2   (0-10)  %


 


Lymphocytes % (Manual)   28   (20-40)  %


 


Atypical Lymphs %   0   %


 


Monocytes % (Manual)   4   (2-10)  %


 


Eosinophils % (Manual)   0 L   (0.7-5.8)  %


 


Basophils % (Manual)   0 L   (0.1-1.2)  


 


Manual Slide Review     


 


Platelet Estimate   Adequate   


 


Poikilocytosis   1+ slight   


 


Ovalocytes   1+ slight   


 


RBC Morph Comment   Not Reportable   


 


PT     (9.5-12.1)  SECONDS


 


INR     


 


Puncture Site  Rt radial    


 


ABG pH  7.40    (7.35-7.45)  


 


ABG pCO2  32.3 L    (35.0-45.0)  mmHg


 


ABG pO2  53.0 L    (80.0-100.0)  mmHg


 


ABG HCO3  19.7 L    (22.0-26.0)  meq/L


 


ABG O2 Saturation  80.8 L    (96.0-97.0)  %


 


ABG Base Excess  -3.6 L    (-2-2.0)  


 


Efrem Test  Positive    


 


A-a Gradient  112    mmHg


 


O2 Delivery Device  Nasal cannula    


 


Oxygen Flow Rate  3.0    


 


FiO2  32.00    (21..00)  %


 


Sodium    142  (136-145)  mEq/L


 


Potassium    4.1  (3.5-5.1)  mEq/L


 


Chloride    108 H  ()  mEq/L


 


Carbon Dioxide    22  (21-32)  mEq/L


 


Anion Gap    16.1 H  (5-15)  


 


BUN    20 H  (7-18)  mg/dL


 


Creatinine    1.2 H  (0.55-1.02)  mg/dL


 


Est Cr Clr Drug Dosing    30.92  mL/min


 


Estimated GFR (MDRD)    42  (>60)  mL/min


 


BUN/Creatinine Ratio    16.7  (14-18)  


 


Glucose    107  ()  mg/dL


 


Calcium    8.8  (8.5-10.1)  mg/dL


 


Magnesium     (1.8-2.4)  mg/dl


 


Total Bilirubin    0.6  (0.2-1.0)  mg/dL


 


AST    36  (15-37)  U/L


 


ALT    15  (14-59)  U/L


 


Alkaline Phosphatase    79  ()  U/L


 


Troponin I    < 0.017  (0.00-0.056)  ng/mL


 


C-Reactive Protein    9.4 H*  (<1.0)  mg/dL


 


NT-Pro-B Natriuret Pep     (0-450)  pg/mL


 


Total Protein    7.1  (6.4-8.2)  g/dl


 


Albumin    3.1 L  (3.4-5.0)  g/dl


 


Globulin    4.0  gm/dL


 


Albumin/Globulin Ratio    0.8 L  (1-2)  


 


Urine Color     (Yellow)  


 


Urine Appearance     (Clear)  


 


Urine pH     (5.0-8.0)  


 


Ur Specific Gravity     (1.005-1.030)  


 


Urine Protein     (Negative)  


 


Urine Glucose (UA)     (Negative)  


 


Urine Ketones     (Negative)  


 


Urine Occult Blood     (Negative)  


 


Urine Nitrite     (Negative)  


 


Urine Bilirubin     (Negative)  


 


Urine Urobilinogen     (0.2-1.0)  


 


Ur Leukocyte Esterase     (Negative)  


 


Urine RBC     (0-5)  /hpf


 


Urine WBC     (0-5)  /hpf


 


Ur Epithelial Cells     (0-5)  /hpf


 


Urine Bacteria     (FEW)  /hpf


 


Hyaline Casts     (0-5)  /lpf


 


Urine Mucus     (FEW)  /hpf


 


Mycoplasma pneumon IgM     (NEGATIVE)  


 


MRSA (PCR)     














  06/12/18 06/12/18 06/13/18 Range/Units





  14:50 15:30 00:55 


 


WBC     (3.98-10.04)  K/mm3


 


RBC     (3.98-5.22)  M/mm3


 


Hgb     (11.2-15.7)  gm/L


 


Hct     (34.1-44.9)  %


 


MCV     (79.4-94.8)  fl


 


MCH     (25.6-32.2)  pg


 


MCHC     (32.2-35.5)  g/dl


 


RDW Std Deviation     (36.4-46.3)  fL


 


Plt Count     (182-369)  K/mm3


 


MPV     (9.4-12.3)  fl


 


Neut % (Auto)     (34.0-71.1)  %


 


Lymph % (Auto)     (19.3-51.7)  %


 


Mono % (Auto)     (4.7-12.5)  %


 


Eos % (Auto)     (0.7-5.8)  


 


Baso % (Auto)     (0.1-1.2)  %


 


Neut # (Auto)     (1.56-6.13)  K/mm3


 


Lymph # (Auto)     (1.18-3.74)  K/mm3


 


Mono # (Auto)     (0.24-0.36)  K/mm3


 


Eos # (Auto)     (0.04-0.36)  K/mm3


 


Baso # (Auto)     (0.01-0.08)  K/mm3


 


Neutrophils % (Manual)     (40-60)  %


 


Band Neutrophils %     (0-10)  %


 


Lymphocytes % (Manual)     (20-40)  %


 


Atypical Lymphs %     %


 


Monocytes % (Manual)     (2-10)  %


 


Eosinophils % (Manual)     (0.7-5.8)  %


 


Basophils % (Manual)     (0.1-1.2)  


 


Manual Slide Review     


 


Platelet Estimate     


 


Poikilocytosis     


 


Ovalocytes     


 


RBC Morph Comment     


 


PT   20.3 H   (9.5-12.1)  SECONDS


 


INR   1.89   


 


Puncture Site     


 


ABG pH     (7.35-7.45)  


 


ABG pCO2     (35.0-45.0)  mmHg


 


ABG pO2     (80.0-100.0)  mmHg


 


ABG HCO3     (22.0-26.0)  meq/L


 


ABG O2 Saturation     (96.0-97.0)  %


 


ABG Base Excess     (-2-2.0)  


 


Efrem Test     


 


A-a Gradient     mmHg


 


O2 Delivery Device     


 


Oxygen Flow Rate     


 


FiO2     (21..00)  %


 


Sodium     (136-145)  mEq/L


 


Potassium     (3.5-5.1)  mEq/L


 


Chloride     ()  mEq/L


 


Carbon Dioxide     (21-32)  mEq/L


 


Anion Gap     (5-15)  


 


BUN     (7-18)  mg/dL


 


Creatinine     (0.55-1.02)  mg/dL


 


Est Cr Clr Drug Dosing     mL/min


 


Estimated GFR (MDRD)     (>60)  mL/min


 


BUN/Creatinine Ratio     (14-18)  


 


Glucose     ()  mg/dL


 


Calcium     (8.5-10.1)  mg/dL


 


Magnesium     (1.8-2.4)  mg/dl


 


Total Bilirubin     (0.2-1.0)  mg/dL


 


AST     (15-37)  U/L


 


ALT     (14-59)  U/L


 


Alkaline Phosphatase     ()  U/L


 


Troponin I     (0.00-0.056)  ng/mL


 


C-Reactive Protein     (<1.0)  mg/dL


 


NT-Pro-B Natriuret Pep  3935 H    (0-450)  pg/mL


 


Total Protein     (6.4-8.2)  g/dl


 


Albumin     (3.4-5.0)  g/dl


 


Globulin     gm/dL


 


Albumin/Globulin Ratio     (1-2)  


 


Urine Color    Yellow  (Yellow)  


 


Urine Appearance    Clear  (Clear)  


 


Urine pH    5.5  (5.0-8.0)  


 


Ur Specific Gravity    1.025  (1.005-1.030)  


 


Urine Protein    1+ H  (Negative)  


 


Urine Glucose (UA)    Negative  (Negative)  


 


Urine Ketones    Negative  (Negative)  


 


Urine Occult Blood    Negative  (Negative)  


 


Urine Nitrite    Negative  (Negative)  


 


Urine Bilirubin    Negative  (Negative)  


 


Urine Urobilinogen    0.2  (0.2-1.0)  


 


Ur Leukocyte Esterase    Negative  (Negative)  


 


Urine RBC    Not seen  (0-5)  /hpf


 


Urine WBC    0-5  (0-5)  /hpf


 


Ur Epithelial Cells    0-5  (0-5)  /hpf


 


Urine Bacteria    Not seen  (FEW)  /hpf


 


Hyaline Casts    0-5  (0-5)  /lpf


 


Urine Mucus    Few  (FEW)  /hpf


 


Mycoplasma pneumon IgM     (NEGATIVE)  


 


MRSA (PCR)     














  06/13/18 06/13/18 06/13/18 Range/Units





  00:55 05:45 05:45 


 


WBC   4.21   (3.98-10.04)  K/mm3


 


RBC   4.78   (3.98-5.22)  M/mm3


 


Hgb   14.4   (11.2-15.7)  gm/L


 


Hct   43.2   (34.1-44.9)  %


 


MCV   90.4   (79.4-94.8)  fl


 


MCH   30.1   (25.6-32.2)  pg


 


MCHC   33.3   (32.2-35.5)  g/dl


 


RDW Std Deviation   47.3 H   (36.4-46.3)  fL


 


Plt Count   145 L   (182-369)  K/mm3


 


MPV   10.3   (9.4-12.3)  fl


 


Neut % (Auto)   82.2 H   (34.0-71.1)  %


 


Lymph % (Auto)   14.7 L   (19.3-51.7)  %


 


Mono % (Auto)   2.1 L   (4.7-12.5)  %


 


Eos % (Auto)   0 L   (0.7-5.8)  


 


Baso % (Auto)   1.0   (0.1-1.2)  %


 


Neut # (Auto)   3.46   (1.56-6.13)  K/mm3


 


Lymph # (Auto)   0.62 L   (1.18-3.74)  K/mm3


 


Mono # (Auto)   0.09 L   (0.24-0.36)  K/mm3


 


Eos # (Auto)   0.00 L   (0.04-0.36)  K/mm3


 


Baso # (Auto)   0.04   (0.01-0.08)  K/mm3


 


Neutrophils % (Manual)     (40-60)  %


 


Band Neutrophils %     (0-10)  %


 


Lymphocytes % (Manual)     (20-40)  %


 


Atypical Lymphs %     %


 


Monocytes % (Manual)     (2-10)  %


 


Eosinophils % (Manual)     (0.7-5.8)  %


 


Basophils % (Manual)     (0.1-1.2)  


 


Manual Slide Review   Normal smear   


 


Platelet Estimate     


 


Poikilocytosis     


 


Ovalocytes     


 


RBC Morph Comment     


 


PT     (9.5-12.1)  SECONDS


 


INR     


 


Puncture Site     


 


ABG pH     (7.35-7.45)  


 


ABG pCO2     (35.0-45.0)  mmHg


 


ABG pO2     (80.0-100.0)  mmHg


 


ABG HCO3     (22.0-26.0)  meq/L


 


ABG O2 Saturation     (96.0-97.0)  %


 


ABG Base Excess     (-2-2.0)  


 


Efrem Test     


 


A-a Gradient     mmHg


 


O2 Delivery Device     


 


Oxygen Flow Rate     


 


FiO2     (21..00)  %


 


Sodium    138  (136-145)  mEq/L


 


Potassium    4.8  (3.5-5.1)  mEq/L


 


Chloride    105  ()  mEq/L


 


Carbon Dioxide    20 L  (21-32)  mEq/L


 


Anion Gap    17.8 H  (5-15)  


 


BUN    19 H  (7-18)  mg/dL


 


Creatinine    1.2 H  (0.55-1.02)  mg/dL


 


Est Cr Clr Drug Dosing    30.92  mL/min


 


Estimated GFR (MDRD)    42  (>60)  mL/min


 


BUN/Creatinine Ratio    15.8  (14-18)  


 


Glucose    163 H  ()  mg/dL


 


Calcium    8.9  (8.5-10.1)  mg/dL


 


Magnesium    1.9  (1.8-2.4)  mg/dl


 


Total Bilirubin     (0.2-1.0)  mg/dL


 


AST     (15-37)  U/L


 


ALT     (14-59)  U/L


 


Alkaline Phosphatase     ()  U/L


 


Troponin I     (0.00-0.056)  ng/mL


 


C-Reactive Protein    9.6 H*  (<1.0)  mg/dL


 


NT-Pro-B Natriuret Pep     (0-450)  pg/mL


 


Total Protein     (6.4-8.2)  g/dl


 


Albumin     (3.4-5.0)  g/dl


 


Globulin     gm/dL


 


Albumin/Globulin Ratio     (1-2)  


 


Urine Color     (Yellow)  


 


Urine Appearance     (Clear)  


 


Urine pH     (5.0-8.0)  


 


Ur Specific Gravity     (1.005-1.030)  


 


Urine Protein     (Negative)  


 


Urine Glucose (UA)     (Negative)  


 


Urine Ketones     (Negative)  


 


Urine Occult Blood     (Negative)  


 


Urine Nitrite     (Negative)  


 


Urine Bilirubin     (Negative)  


 


Urine Urobilinogen     (0.2-1.0)  


 


Ur Leukocyte Esterase     (Negative)  


 


Urine RBC     (0-5)  /hpf


 


Urine WBC     (0-5)  /hpf


 


Ur Epithelial Cells     (0-5)  /hpf


 


Urine Bacteria     (FEW)  /hpf


 


Hyaline Casts     (0-5)  /lpf


 


Urine Mucus     (FEW)  /hpf


 


Mycoplasma pneumon IgM    Negative  (NEGATIVE)  


 


MRSA (PCR)  Negative    














  06/13/18 Range/Units





  05:45 


 


WBC   (3.98-10.04)  K/mm3


 


RBC   (3.98-5.22)  M/mm3


 


Hgb   (11.2-15.7)  gm/L


 


Hct   (34.1-44.9)  %


 


MCV   (79.4-94.8)  fl


 


MCH   (25.6-32.2)  pg


 


MCHC   (32.2-35.5)  g/dl


 


RDW Std Deviation   (36.4-46.3)  fL


 


Plt Count   (182-369)  K/mm3


 


MPV   (9.4-12.3)  fl


 


Neut % (Auto)   (34.0-71.1)  %


 


Lymph % (Auto)   (19.3-51.7)  %


 


Mono % (Auto)   (4.7-12.5)  %


 


Eos % (Auto)   (0.7-5.8)  


 


Baso % (Auto)   (0.1-1.2)  %


 


Neut # (Auto)   (1.56-6.13)  K/mm3


 


Lymph # (Auto)   (1.18-3.74)  K/mm3


 


Mono # (Auto)   (0.24-0.36)  K/mm3


 


Eos # (Auto)   (0.04-0.36)  K/mm3


 


Baso # (Auto)   (0.01-0.08)  K/mm3


 


Neutrophils % (Manual)   (40-60)  %


 


Band Neutrophils %   (0-10)  %


 


Lymphocytes % (Manual)   (20-40)  %


 


Atypical Lymphs %   %


 


Monocytes % (Manual)   (2-10)  %


 


Eosinophils % (Manual)   (0.7-5.8)  %


 


Basophils % (Manual)   (0.1-1.2)  


 


Manual Slide Review   


 


Platelet Estimate   


 


Poikilocytosis   


 


Ovalocytes   


 


RBC Morph Comment   


 


PT  23.6 H  (9.5-12.1)  SECONDS


 


INR  2.20  


 


Puncture Site   


 


ABG pH   (7.35-7.45)  


 


ABG pCO2   (35.0-45.0)  mmHg


 


ABG pO2   (80.0-100.0)  mmHg


 


ABG HCO3   (22.0-26.0)  meq/L


 


ABG O2 Saturation   (96.0-97.0)  %


 


ABG Base Excess   (-2-2.0)  


 


Efrem Test   


 


A-a Gradient   mmHg


 


O2 Delivery Device   


 


Oxygen Flow Rate   


 


FiO2   (21..00)  %


 


Sodium   (136-145)  mEq/L


 


Potassium   (3.5-5.1)  mEq/L


 


Chloride   ()  mEq/L


 


Carbon Dioxide   (21-32)  mEq/L


 


Anion Gap   (5-15)  


 


BUN   (7-18)  mg/dL


 


Creatinine   (0.55-1.02)  mg/dL


 


Est Cr Clr Drug Dosing   mL/min


 


Estimated GFR (MDRD)   (>60)  mL/min


 


BUN/Creatinine Ratio   (14-18)  


 


Glucose   ()  mg/dL


 


Calcium   (8.5-10.1)  mg/dL


 


Magnesium   (1.8-2.4)  mg/dl


 


Total Bilirubin   (0.2-1.0)  mg/dL


 


AST   (15-37)  U/L


 


ALT   (14-59)  U/L


 


Alkaline Phosphatase   ()  U/L


 


Troponin I   (0.00-0.056)  ng/mL


 


C-Reactive Protein   (<1.0)  mg/dL


 


NT-Pro-B Natriuret Pep   (0-450)  pg/mL


 


Total Protein   (6.4-8.2)  g/dl


 


Albumin   (3.4-5.0)  g/dl


 


Globulin   gm/dL


 


Albumin/Globulin Ratio   (1-2)  


 


Urine Color   (Yellow)  


 


Urine Appearance   (Clear)  


 


Urine pH   (5.0-8.0)  


 


Ur Specific Gravity   (1.005-1.030)  


 


Urine Protein   (Negative)  


 


Urine Glucose (UA)   (Negative)  


 


Urine Ketones   (Negative)  


 


Urine Occult Blood   (Negative)  


 


Urine Nitrite   (Negative)  


 


Urine Bilirubin   (Negative)  


 


Urine Urobilinogen   (0.2-1.0)  


 


Ur Leukocyte Esterase   (Negative)  


 


Urine RBC   (0-5)  /hpf


 


Urine WBC   (0-5)  /hpf


 


Ur Epithelial Cells   (0-5)  /hpf


 


Urine Bacteria   (FEW)  /hpf


 


Hyaline Casts   (0-5)  /lpf


 


Urine Mucus   (FEW)  /hpf


 


Mycoplasma pneumon IgM   (NEGATIVE)  


 


MRSA (PCR)   











Med Orders - Current: 


 Current Medications





Acetaminophen (Tylenol)  650 mg PO Q4H PRN


   PRN Reason: Pain (Mild 1-3)/fever


Albuterol/Ipratropium (Duoneb 3.0-0.5 Mg/3 Ml)  3 ml NEB QIDRT [facility]


Amlodipine Besylate (Norvasc)  10 mg PO DAILY [facility]


   Last Admin: 06/13/18 11:54 Dose:  10 mg


Aspirin (Aspirin)  81 mg PO DAILY [facility]


   Last Admin: 06/13/18 11:48 Dose:  81 mg


Benazepril HCl (Lotensin)  40 mg PO DAILY [facility]


   Last Admin: 06/13/18 11:49 Dose:  40 mg


Benzonatate (Tessalon Perles)  100 mg PO TID PRN


   PRN Reason: Cough


   Last Admin: 06/12/18 20:54 Dose:  100 mg


Bisacodyl (Dulcolax)  5 mg PO DAILY PRN


   PRN Reason: Constipation


Docusate Sodium (Colace)  100 mg PO BID PRN


   PRN Reason: Constipation


Furosemide (Lasix)  40 mg PO DAILY [facility]


   Last Admin: 06/13/18 11:49 Dose:  40 mg


Guaifenesin/Phenylephrine HCl (Robitussin Dm)  5 ml PO Q4H PRN


   PRN Reason: Cough


Hydralazine HCl (Apresoline)  10 mg IVPUSH Q4H PRN


   PRN Reason: Hypertension


Hydromorphone HCl (Dilaudid)  0.25 mg IVPUSH Q2H PRN


   PRN Reason: Pain (severe 7-10)


Promethazine HCl 6.25 mg/ (Sodium Chloride)  50.25 mls @ 100 mls/hr IV Q6H PRN


   PRN Reason: Nausea/Vomiting


Ceftriaxone Sodium 1 gm/ (Sodium Chloride)  100 mls @ 200 mls/hr IV DAILY [facility]


   Stop: 06/18/18 09:01


   Last Admin: 06/13/18 11:54 Dose:  200 mls/hr


Azithromycin 500 mg/ Sodium (Chloride)  250 mls @ 250 mls/hr IV Q24H [facility]


   Stop: 06/15/18 09:59


Ipratropium Bromide (Atrovent)  0.5 mg NEB QIDRT [facility]


Levothyroxine Sodium (Synthroid)  88 mcg PO ACBREAKFAST [facility]


   Last Admin: 06/13/18 06:43 Dose:  88 mcg


Magnesium Hydroxide (Milk Of Magnesia)  30 ml PO Q12H PRN


   PRN Reason: Constipation


Methylprednisolone Sodium Succinate (Solu-Medrol)  125 mg IVPUSH Q8H [facility]


   Last Admin: 06/13/18 11:54 Dose:  125 mg


Metoprolol Tartrate (Lopressor)  5 mg IVPUSH Q4H PRN


   PRN Reason: Tachycardia


   Last Admin: 06/13/18 09:39 Dose:  5 mg


Metoprolol Tartrate (Lopressor)  50 mg PO TID [facility]


Mirtazapine (Remeron)  15 mg PO DAILY [facility]


   Last Admin: 06/13/18 11:54 Dose:  15 mg


Oxycodone/Acetaminophen (Percocet 325-5 Mg)  1 tab PO Q4H PRN


   PRN Reason: Pain (moderate 4-6)


Tiotropium Br/Olodaterol Hcl [ Stiolto Respimat Inhal Spra  0 each INH DAILY [facility]


   Last Admin: 06/13/18 11:26 Dose:  1 each


Polyethylene Glycol (Miralax)  17 gm PO DAILY PRN


   PRN Reason: Constipation


Potassium Chloride (Klor-Con M20)  20 meq PO BIDMEALS [facility]


   Last Admin: 06/13/18 06:43 Dose:  20 meq


Promethazine HCl (Phenergan)  25 mg PO Q6H PRN


   PRN Reason: Nausea/Vomiting


Saccharomyces Boulardii (Florastor)  250 mg PO DAILY [facility]


   Last Admin: 06/13/18 11:48 Dose:  250 mg


Senna/Docusate Sodium (Senna Plus)  1 tab PO BID PRN


   PRN Reason: Constipation


Sodium Chloride (Saline Flush)  10 ml FLUSH ASDIRECTED PRN


   PRN Reason: Keep Vein Open


   Last Admin: 06/12/18 15:05 Dose:  10 ml


Temazepam (Restoril)  7.5 mg PO BEDTIME PRN


   PRN Reason: Sleep


Vit A/Vit C/Vit E/Selen/Cu/Zn/Lutei (Icaps Mv)  1 tab PO DAILY [facility]


   Last Admin: 06/13/18 11:49 Dose:  1 tab


Warfarin Sodium (Pharmacy To Dose - Warfarin)  1 dose .XX ASDIRECTED [facility]


Warfarin Sodium (Coumadin)  2 mg PO ONETIME ONE


   Stop: 06/13/18 18:01





Discontinued Medications





Albuterol/Ipratropium (Duoneb 3.0-0.5 Mg/3 Ml)  3 ml NEB ONETIME ONE


   Stop: 06/12/18 14:03


   Last Admin: 06/12/18 14:14 Dose:  3 ml


Albuterol/Ipratropium (Duoneb 3.0-0.5 Mg/3 Ml)  3 ml NEB ONETIME ONE


   Stop: 06/12/18 16:54


   Last Admin: 06/12/18 17:04 Dose:  3 ml


Albuterol/Ipratropium (Duoneb 3.0-0.5 Mg/3 Ml)  3 ml NEB Q4H PRN


   PRN Reason: Shortness Of Breath/wheezing


   Last Admin: 06/13/18 08:20 Dose:  3 ml


Albuterol/Ipratropium (Duoneb 3.0-0.5 Mg/3 Ml)  3 ml NEB QID [facility]


Levofloxacin/Dextrose 750 mg/ (Premix)  150 mls @ 100 mls/hr IV ONETIME ONE


   Stop: 06/12/18 16:25


   Last Admin: 06/12/18 15:40 Dose:  100 mls/hr


Azithromycin 500 mg/ Sodium (Chloride)  250 mls @ 250 mls/hr IV ONETIME ONE


   Stop: 06/13/18 09:59


Azithromycin 250 mg/ Sodium (Chloride)  250 mls @ 250 mls/hr IV Q24H [facility]


   Stop: 06/16/18 19:31


Azithromycin 500 mg/ Sodium (Chloride)  250 mls @ 250 mls/hr IV ONETIME ONE


   Stop: 06/13/18 09:59


   Last Admin: 06/13/18 09:35 Dose:  250 mls/hr


Ipratropium Bromide (Atrovent)  0.5 mg NEB QID [facility]


Metoprolol Tartrate (Lopressor)  50 mg PO BID [facility]


   Last Admin: 06/13/18 09:34 Dose:  50 mg


Non-Formulary Medication (Melatonin)  3 mg PO BEDTIME [facility]


Prednisone (Prednisone)  40 mg PO DAILY [facility]


   Stop: 06/17/18 19:16


   Last Admin: 06/13/18 12:13 Dose:  Not Given


Warfarin Sodium (Coumadin)  1 mg PO ONETIME ONE


   Stop: 06/12/18 19:31


   Last Admin: 06/12/18 20:55 Dose:  1 mg











- Exam


Quality Assessment: Supplemental Oxygen (5L O2 nasal cannula, usually not on at 

home), DVT Prophylaxis


General: Alert, Oriented, Cooperative, Mild Distress


HEENT: Pupils Equal, Pupils Reactive, EOMI, Mucous Membr. Moist/Pink


Neck: Supple


Lungs: Decreased Breath Sounds, Crackles, Rales, Wheezing


Cardiovascular: Regular Rate, Irregular Rhythm (h/o A fib)


GI/Abdominal Exam: Normal Bowel Sounds, Soft, Non-Tender, No Organomegaly, No 

Distention, No Abnormal Bruit, No Mass, Pelvis Stable


 (Female) Exam: Deferred


Back Exam: Normal Inspection, Decreased Range of Motion


Extremities: Normal Inspection, Normal Range of Motion, Non-Tender, No Pedal 

Edema, Normal Capillary Refill


Peripheral Pulses: 2+: Posterior Tibial (L), Posterior Tibial (R), Dorsalis 

Pedis (L), Dorsalis Pedis (R)


Skin: Warm, Dry, Intact


Neurological: No New Focal Deficit, Cranial Nerves Intact (grossly).  No: 

Normal Gait


Psy/Mental Status: Alert, Normal Affect, Normal Mood





- Problem List & Annotations


(1) Pneumonia


SNOMED Code(s): 911178150


   Code(s): J18.9 - PNEUMONIA, UNSPECIFIED ORGANISM   Status: Acute   Priority: 

High   Current Visit: Yes   


Qualifiers: 


   Pneumonia type: due to unspecified organism   Laterality: right   Lung 

location: lower lobe of lung   Qualified Code(s): J18.1 - Lobar pneumonia, 

unspecified organism   





(2) Acute hypoxemic respiratory failure


SNOMED Code(s): 800482559


   Code(s): J96.01 - ACUTE RESPIRATORY FAILURE WITH HYPOXIA   Status: Acute   

Priority: High   Current Visit: Yes   





(3) Heart failure


SNOMED Code(s): 90312922


   Code(s): I50.9 - HEART FAILURE, UNSPECIFIED   Status: Chronic   Priority: 

Medium   Current Visit: Yes   


Qualifiers: 


   Heart failure type: unspecified   Heart failure chronicity: unspecified   

Qualified Code(s): I50.9 - Heart failure, unspecified   





(4) Subtherapeutic international normalized ratio (INR)


SNOMED Code(s): 345198867, 215279371


   Code(s): R79.1 - ABNORMAL COAGULATION PROFILE   Status: Acute   Priority: 

High   Current Visit: Yes   





(5) Weakness


SNOMED Code(s): 19964292


   Code(s): R53.1 - WEAKNESS   Status: Acute   Priority: Medium   Current Visit

: Yes   





(6) Hypothyroid


SNOMED Code(s): 02761730


   Code(s): E03.9 - HYPOTHYROIDISM, UNSPECIFIED   Status: Chronic   Priority: 

Low   Current Visit: Yes   


Qualifiers: 


   Hypothyroidism type: unspecified   Qualified Code(s): E03.9 - Hypothyroidism

, unspecified   





(7) COPD (chronic obstructive pulmonary disease)


SNOMED Code(s): 34231659


   Code(s): J44.9 - CHRONIC OBSTRUCTIVE PULMONARY DISEASE, UNSPECIFIED   Status

: Chronic   Priority: Medium   Current Visit: Yes   


Qualifiers: 


   COPD type: emphysema   Emphysema type: unspecified   Qualified Code(s): 

J43.9 - Emphysema, unspecified   





(8) Afib


SNOMED Code(s): 99307298


   Code(s): I48.91 - UNSPECIFIED ATRIAL FIBRILLATION   Status: Acute   Priority

: High   Current Visit: Yes   


Qualifiers: 


   Atrial fibrillation type: unspecified   Qualified Code(s): I48.91 - 

Unspecified atrial fibrillation   





- Problem List Review


Problem List Initiated/Reviewed/Updated: Yes





- My Orders


Last 24 Hours: 


My Active Orders





06/12/18 18:18


SLP Evaluation and Treatment [CONS] Routine 





06/12/18 18:24


Height and Weight [RC] 04 


Consult to Dietician [CONS] Routine 


Consult to Spiritual Care [CONS] Routine 


OT Evaluation and Treatment [CONS] Routine 


PT Evaluation and Treatment [CONS] Routine 


Respiratory Care Assess and Treatment [CONS] Routine 


Acetaminophen [Tylenol]   650 mg PO Q4H PRN 


Acetaminophen/oxyCODONE [Percocet 325-5 MG]   1 tab PO Q4H PRN 


Bisacodyl [Dulcolax]   5 mg PO DAILY PRN 


Docusate Sodium [Colace]   100 mg PO BID PRN 


Docusate Sodium/Sennosides [Senna Plus]   1 tab PO BID PRN 


HYDROmorphone [Dilaudid]   0.25 mg IVPUSH Q2H PRN 


Magnesium Hydroxide [Milk of Magnesia]   30 ml PO Q12H PRN 


Polyethylene Glycol 3350 [MiraLAX]   17 gm PO DAILY PRN 


Promethazine [Phenergan]   25 mg PO Q6H PRN 


Promethazine [Phenergan] 6.25 mg   Sodium Chloride 0.9% [Normal Saline] 50 ml 

IV Q6H 


Temazepam [Restoril]   7.5 mg PO BEDTIME PRN 





06/12/18 18:25


VTE/DVT Education [RC] DAILY 


Vital Signs [RC] Q4HR 





06/12/18 18:27


Intake and Output [RC] 04,16 


Antiembolic Hose [OM.PC] Per Unit Routine 


Sequential Compression Device [OM.PC] Per Unit Routine 





06/12/18 18:28


RT Aerosol Therapy [RC] ASDIRECTED 





06/12/18 18:34


Resuscitation Status Routine 





06/12/18 18:42


RT Acapella [RESPCARE] Routine 





06/12/18 18:47


Benzonatate [Tessalon Perles]   100 mg PO TID PRN 


Dextromethorphan/guaiFENesin [Robitussin DM]   5 ml PO Q4H PRN 





06/12/18 19:00


CULTURE SPUTUM + SMEAR [RM] Routine 


RESPIRATORY PANEL BY PCR [MREF] Routine 


Warfarin Pharmacy to Dose [Pharmacy to Dose - Warfarin]   1 dose .XX ASDIRECTED 





06/12/18 19:30


Potassium Chloride [Klor-Con M20]   20 meq PO BIDMEALS 





06/12/18 20:32


Metoprolol Tartrate [Lopressor]   5 mg IVPUSH Q4H PRN 





06/12/18 20:33


hydrALAZINE [Apresoline]   10 mg IVPUSH Q4H PRN 





06/12/18 Dinner


Sodium Restricted Diet [DIET] 





06/13/18 00:55


STREP PNEUMONIAE ANTIGEN [MREF] Routine 


UA W/MICROSCOPIC [URIN] Routine 





06/13/18 06:00


Levothyroxine [Synthroid]   88 mcg PO ACBREAKFAST 





06/13/18 09:00


Aspirin   81 mg PO DAILY 


Benazepril [Lotensin]   40 mg PO DAILY 


Furosemide [Lasix]   40 mg PO DAILY 


Mirtazapine [Remeron]   15 mg PO DAILY 


Multivitamins/Min/FA/Lut/Zeax [ICaps MV]   1 tab PO DAILY 


Patient's Own Medication [Ptom]   0 each INH DAILY 


Saccharomyces Boulardii [Florastor]   250 mg PO DAILY 


amLODIPine [Norvasc]   10 mg PO DAILY 


cefTRIAXone [Rocephin] 1 gm   Sodium Chloride 0.9% [Normal Saline] 100 ml IV 

DAILY 





06/13/18 Breakfast


Soft Diet [DIET] 





06/14/18 05:11


Chest 1V Frontal [CR] AM 


BASIC METABOLIC PANEL,BMP [CHEM] AM 


C-REACTIVE PROTEIN [CHEM] AM 


CBC WITH AUTO DIFF [HEME] AM 


MAGNESIUM [CHEM] AM 





06/14/18 09:00


Azithromycin [Zithromax] 500 mg   Sodium Chloride 0.9% [Normal Saline] 250 ml 

IV Q24H 





06/15/18 05:11


BASIC METABOLIC PANEL,BMP [CHEM] AM 


C-REACTIVE PROTEIN [CHEM] AM 


CBC WITH AUTO DIFF [HEME] AM 


MAGNESIUM [CHEM] AM 





06/16/18 05:11


BASIC METABOLIC PANEL,BMP [CHEM] AM 


C-REACTIVE PROTEIN [CHEM] AM 


CBC WITH AUTO DIFF [HEME] AM 


MAGNESIUM [CHEM] AM 





06/17/18 05:11


BASIC METABOLIC PANEL,BMP [CHEM] AM 


C-REACTIVE PROTEIN [CHEM] AM 


CBC WITH AUTO DIFF [HEME] AM 


MAGNESIUM [CHEM] AM 














- Plan


Plan:: 


I/P: 





Acute:





Community Acquired Pneumonia 


   - Risk Factors: COPD, elderly, previous hospitalization for pneumonia 


   - Pneumovax 6/2009, Prevnar 8/2015


   - CURB 65 Score is 2 points:  6.8-30% mortality with recommendation to 

consider inpatient 


   - PSI/PORT Score is 107:  8.2-9.3% mortality with hospitalization 

recommended based on risk  


   - She does not meet criteria for Sepsis/SIRS at this time 


   - ED CXR 6/12/18 initial report--> possible right sided PNA


   - Afebrile and no leukocytosis --> does take Tylenol daily which could be 

affecting her temperature 


   - CRP 9.4-->9.6 


   - Sputum Cx, Strep pneumoniae Ag and Respiratory Viral Panel


   - Mycoplasma pneumoniae Ag negative 


   - SLP consult--> no dysphagia; ruled out aspiration PNA


   - IV Levaquin 750 mg IV daily given in ED--> D/C


   - Azithromycin 500 mg IV x 3 days and Rocephin 1 gm IV QDaily x5 days


      -Patient reports rash with PCN previously but states that she has had 

PCNs since that episode and has not had any reactions 


   - Solumedrol 125mg Q8h--> taper to 80mg tomorrow 


   - Decongestant and Expectorant Q4H PRN 


   - Supplemental O2 and breathing treatments PRN


   - Serial CXR as indicated and RT/IS/DuoNebs/Acapella as directed 


   - Recommend outpatient PFTs 





Heart Failure


   - BNP 3935 


   - Continue home meds--> Benazepril 40 mg daily, furosemide 40 mg daily, 

metoprolol tartrate 50 mg BID


   - Patient and daughter do not think patient has ever had 2D echo --> will 

order 2D Echo; if EF =< 35%, consider addition of spironolactone 


   - Heart Healthy diet 


   - Dietitian consult 





Subtherapeutic INR, acute on chronic


   - hx of afib


   - Pt states she has been subtherapeutic chronically


   - INR 1.89--> 2.2


   - she is on warfarin 1 mg q Tuesday and 2 mg daily 


   - Pharmacy to adjust warfarin 


   - May want to consider starting Eliquis or Xaralto; F/U with PCP





Weakness


   - exacerbated by current illness


   - uses a walker and cane at home but states she is usually independent


   - PT/OT consult


 


Chronic: 


Afib--> Lopressor increased from 5mg BID to TID


COPD


CHF


Rheumatic fever as toddler 


Hx/o Pneumonia in the past


Glaucoma, Cataracts


Recurrent UTI --> recent completion of 7 day treatment with last dose of Cipro 

yesterday, UA negative here


Hypothyroidism


OA


Rectal cancer with surgical intervention for which she now has a colostomy





Plan: 


Admitted from ED to Med/Surg with Tele


Other orders as indicated above 


CM/SW for discharge planning 


Routine AM labs 


Will order UA with hx of recurrent UTIs and recent completion of treatment 


Continue home meds 


Heart healthy diet  


Consult dietitian 


Consult PT/OT 


Consult SLP 


DVT Prophylaxis: She is on warfarin at home and also wears KURT hose; will 

continue these inpatient with SCDs


Ambulate as tolerated 


Code Status: CPR only  


PCP: Dr. Nereyda Rodriguez in [place], [place] 





<Shireen Song - Last Filed: 06/14/18 15:50>





- Patient Data


Vitals - Most Recent: 


 Last Vital Signs











Temp  37.2 C   06/14/18 11:53


 


Pulse  75   06/14/18 15:12


 


Resp  20   06/14/18 07:43


 


BP  113/85   06/14/18 11:53


 


Pulse Ox  89 L  06/14/18 15:12











I&O - Last 24 Hours: 


 Intake & Output











 06/14/18 06/14/18 06/14/18





 06:59 14:59 22:59


 


Intake Total 400 0 360


 


Output Total 600  


 


Balance -200 0 360











Lab Results Last 24 Hours: 


 Laboratory Results - last 24 hr











  06/14/18 06/14/18 06/14/18 Range/Units





  05:45 05:45 05:45 


 


WBC  7.59    (3.98-10.04)  K/mm3


 


RBC  4.45    (3.98-5.22)  M/mm3


 


Hgb  13.5    (11.2-15.7)  gm/L


 


Hct  40.1    (34.1-44.9)  %


 


MCV  90.1    (79.4-94.8)  fl


 


MCH  30.3    (25.6-32.2)  pg


 


MCHC  33.7    (32.2-35.5)  g/dl


 


RDW Std Deviation  46.2    (36.4-46.3)  fL


 


Plt Count  175 L    (182-369)  K/mm3


 


MPV  10.6    (9.4-12.3)  fl


 


Neut % (Auto)  85.8 H    (34.0-71.1)  %


 


Lymph % (Auto)  12.4 L    (19.3-51.7)  %


 


Mono % (Auto)  1.4 L    (4.7-12.5)  %


 


Eos % (Auto)  0 L    (0.7-5.8)  


 


Baso % (Auto)  0.3    (0.1-1.2)  %


 


Neut # (Auto)  6.51 H    (1.56-6.13)  K/mm3


 


Lymph # (Auto)  0.94 L    (1.18-3.74)  K/mm3


 


Mono # (Auto)  0.11 L    (0.24-0.36)  K/mm3


 


Eos # (Auto)  0.00 L    (0.04-0.36)  K/mm3


 


Baso # (Auto)  0.02    (0.01-0.08)  K/mm3


 


Manual Slide Review  Normal smear    


 


PT    30.5 H  (9.5-12.1)  SECONDS


 


INR    2.86  


 


Sodium   138   (136-145)  mEq/L


 


Potassium   4.6   (3.5-5.1)  mEq/L


 


Chloride   108 H   ()  mEq/L


 


Carbon Dioxide   21   (21-32)  mEq/L


 


Anion Gap   13.6   (5-15)  


 


BUN   35 H   (7-18)  mg/dL


 


Creatinine   1.2 H   (0.55-1.02)  mg/dL


 


Est Cr Clr Drug Dosing   30.92   mL/min


 


Estimated GFR (MDRD)   42   (>60)  mL/min


 


BUN/Creatinine Ratio   29.2 H   (14-18)  


 


Glucose   172 H   ()  mg/dL


 


Calcium   8.9   (8.5-10.1)  mg/dL


 


Magnesium   1.9   (1.8-2.4)  mg/dl


 


C-Reactive Protein   5.5 H*   (<1.0)  mg/dL











Jim Results Last 24 Hours: 


 Microbiology











 06/12/18 14:50 Aerobic Blood Culture - Preliminary





 Blood    NO GROWTH AFTER 2 DAYS





 Anaerobic Blood Culture - Preliminary





    NO GROWTH AFTER 2 DAYS


 


 06/12/18 15:04 Aerobic Blood Culture - Preliminary





 Blood    NO GROWTH AFTER 2 DAYS





 Anaerobic Blood Culture - Preliminary





    NO GROWTH AFTER 2 DAYS


 


 06/12/18 12:00 Gram Stain - Final





 Sputum - Expectorated Sputum Culture - Final


 


 06/13/18 05:00 Respiratory Virus Panel (PCR) - Final





 Nasopharyngeal Swab 


 


 06/13/18 00:55 Streptococcus pneumoniae Antigen (M - Final





 Urine 











Med Orders - Current: 


 Current Medications





Acetaminophen (Tylenol)  650 mg PO Q4H PRN


   PRN Reason: Pain (Mild 1-3)/fever


Amlodipine Besylate (Norvasc)  10 mg PO DAILY [facility]


   Last Admin: 06/14/18 09:58 Dose:  10 mg


Aspirin (Aspirin)  81 mg PO DAILY [facility]


   Last Admin: 06/14/18 09:59 Dose:  81 mg


Benazepril HCl (Lotensin)  40 mg PO DAILY [facility]


   Last Admin: 06/14/18 09:58 Dose:  40 mg


Benzonatate (Tessalon Perles)  100 mg PO TID PRN


   PRN Reason: Cough


   Last Admin: 06/12/18 20:54 Dose:  100 mg


Bisacodyl (Dulcolax)  5 mg PO DAILY PRN


   PRN Reason: Constipation


Bupropion HCl (Wellbutrin Sr)  100 mg PO BID [facility]


   Last Admin: 06/14/18 09:59 Dose:  100 mg


Docusate Sodium (Colace)  100 mg PO BID PRN


   PRN Reason: Constipation


Furosemide (Lasix)  40 mg PO DAILY [facility]


   Last Admin: 06/14/18 09:59 Dose:  40 mg


Guaifenesin/Phenylephrine HCl (Robitussin Dm)  5 ml PO Q4H PRN


   PRN Reason: Cough


Hydralazine HCl (Apresoline)  10 mg IVPUSH Q4H PRN


   PRN Reason: Hypertension


   Last Admin: 06/14/18 04:46 Dose:  10 mg


Hydromorphone HCl (Dilaudid)  0.25 mg IVPUSH Q2H PRN


   PRN Reason: Pain (severe 7-10)


Promethazine HCl 6.25 mg/ (Sodium Chloride)  50.25 mls @ 100 mls/hr IV Q6H PRN


   PRN Reason: Nausea/Vomiting


Ceftriaxone Sodium 1 gm/ (Sodium Chloride)  100 mls @ 200 mls/hr IV DAILY [facility]


   Stop: 06/18/18 09:01


   Last Admin: 06/14/18 09:56 Dose:  200 mls/hr


Azithromycin 500 mg/ Sodium (Chloride)  250 mls @ 250 mls/hr IV Q24H [facility]


   Stop: 06/15/18 09:59


   Last Admin: 06/14/18 09:59 Dose:  250 mls/hr


Ipratropium Bromide (Atrovent)  0.5 mg NEB QIDRT [facility]


   Last Admin: 06/14/18 15:11 Dose:  0.5 mg


Levothyroxine Sodium (Synthroid)  88 mcg PO ACBREAKFAST [facility]


   Last Admin: 06/14/18 06:30 Dose:  88 mcg


Magnesium Hydroxide (Milk Of Magnesia)  30 ml PO Q12H PRN


   PRN Reason: Constipation


Methylprednisolone Sodium Succinate (Solu-Medrol)  125 mg IVPUSH Q8H [facility]


   Last Admin: 06/14/18 11:33 Dose:  125 mg


Metoprolol Tartrate (Lopressor)  5 mg IVPUSH Q4H PRN


   PRN Reason: Tachycardia


   Last Admin: 06/14/18 10:39 Dose:  5 mg


Metoprolol Tartrate (Lopressor)  50 mg PO TID [facility]


   Last Admin: 06/14/18 09:58 Dose:  50 mg


Mirtazapine (Remeron)  15 mg PO DAILY [facility]


   Last Admin: 06/14/18 09:59 Dose:  15 mg


Oxycodone/Acetaminophen (Percocet 325-5 Mg)  1 tab PO Q4H PRN


   PRN Reason: Pain (moderate 4-6)


Tiotropium Br/Olodaterol Hcl [ Stiolto Respimat Inhal Spra  0 each INH DAILY [facility]


   Last Admin: 06/14/18 09:47 Dose:  1 each


Polyethylene Glycol (Miralax)  17 gm PO DAILY PRN


   PRN Reason: Constipation


Potassium Chloride (Klor-Con M20)  20 meq PO BIDMEALS [facility]


   Last Admin: 06/14/18 06:30 Dose:  20 meq


Promethazine HCl (Phenergan)  25 mg PO Q6H PRN


   PRN Reason: Nausea/Vomiting


Saccharomyces Boulardii (Florastor)  250 mg PO DAILY [facility]


   Last Admin: 06/14/18 09:57 Dose:  250 mg


Senna/Docusate Sodium (Senna Plus)  1 tab PO BID PRN


   PRN Reason: Constipation


Sodium Chloride (Saline Flush)  10 ml FLUSH ASDIRECTED PRN


   PRN Reason: Keep Vein Open


   Last Admin: 06/12/18 15:05 Dose:  10 ml


Temazepam (Restoril)  7.5 mg PO BEDTIME PRN


   PRN Reason: Sleep


Vit A/Vit C/Vit E/Selen/Cu/Zn/Lutei (Icaps Mv)  1 tab PO DAILY [facility]


   Last Admin: 06/14/18 09:57 Dose:  1 tab


Warfarin Sodium (Pharmacy To Dose - Warfarin)  1 dose .XX ASDIRECTED [facility]


Warfarin Sodium (Coumadin)  2 mg PO [id number] [facility]


Warfarin Sodium (Coumadin)  1 mg PO Tu [facility]





Discontinued Medications





Albuterol/Ipratropium (Duoneb 3.0-0.5 Mg/3 Ml)  3 ml NEB ONETIME ONE


   Stop: 06/12/18 14:03


   Last Admin: 06/12/18 14:14 Dose:  3 ml


Albuterol/Ipratropium (Duoneb 3.0-0.5 Mg/3 Ml)  3 ml NEB ONETIME ONE


   Stop: 06/12/18 16:54


   Last Admin: 06/12/18 17:04 Dose:  3 ml


Albuterol/Ipratropium (Duoneb 3.0-0.5 Mg/3 Ml)  3 ml NEB Q4H PRN


   PRN Reason: Shortness Of Breath/wheezing


   Last Admin: 06/13/18 08:20 Dose:  3 ml


Albuterol/Ipratropium (Duoneb 3.0-0.5 Mg/3 Ml)  3 ml NEB QID [facility]


Albuterol/Ipratropium (Duoneb 3.0-0.5 Mg/3 Ml)  3 ml NEB QIDRT [facility]


Bupropion HCl (Wellbutrin Sr)  100 mg PO BID ONE


   Stop: 06/13/18 15:14


   Last Admin: 06/13/18 16:26 Dose:  100 mg


Bupropion HCl (Wellbutrin Sr)  100 mg PO BID [facility]


Levofloxacin/Dextrose 750 mg/ (Premix)  150 mls @ 100 mls/hr IV ONETIME ONE


   Stop: 06/12/18 16:25


   Last Admin: 06/12/18 15:40 Dose:  100 mls/hr


Azithromycin 500 mg/ Sodium (Chloride)  250 mls @ 250 mls/hr IV ONETIME ONE


   Stop: 06/13/18 09:59


Azithromycin 250 mg/ Sodium (Chloride)  250 mls @ 250 mls/hr IV Q24H [facility]


   Stop: 06/16/18 19:31


Azithromycin 500 mg/ Sodium (Chloride)  250 mls @ 250 mls/hr IV ONETIME ONE


   Stop: 06/13/18 09:59


   Last Admin: 06/13/18 09:35 Dose:  250 mls/hr


Ipratropium Bromide (Atrovent)  0.5 mg NEB QID [facility]


Metoprolol Tartrate (Lopressor)  50 mg PO BID [facility]


   Last Admin: 06/13/18 09:34 Dose:  50 mg


Non-Formulary Medication (Melatonin)  3 mg PO BEDTIME [facility]


   Last Admin: 06/13/18 19:51 Dose:  Not Given


Prednisone (Prednisone)  40 mg PO DAILY [facility]


   Stop: 06/17/18 19:16


   Last Admin: 06/13/18 12:13 Dose:  Not Given


Warfarin Sodium (Coumadin)  1 mg PO ONETIME ONE


   Stop: 06/12/18 19:31


   Last Admin: 06/12/18 20:55 Dose:  1 mg


Warfarin Sodium (Coumadin)  2 mg PO ONETIME ONE


   Stop: 06/13/18 18:01


   Last Admin: 06/13/18 18:08 Dose:  2 mg











- My Orders


Last 24 Hours: 


My Active Orders





06/13/18 15:00


Metoprolol Tartrate [Lopressor]   50 mg PO TID 





06/14/18 18:00


Warfarin [Coumadin]   2 mg PO [id number] 





06/19/18 18:00


Warfarin [Coumadin]   1 mg PO Tu 














- Plan


Plan:: 


Await result of 2D echo

## 2018-06-14 RX ADMIN — IPRATROPIUM BROMIDE SCH MG: 0.5 SOLUTION RESPIRATORY (INHALATION) at 20:44

## 2018-06-14 RX ADMIN — POTASSIUM CHLORIDE SCH MEQ: 1500 TABLET, EXTENDED RELEASE ORAL at 06:30

## 2018-06-14 RX ADMIN — IPRATROPIUM BROMIDE SCH MG: 0.5 SOLUTION RESPIRATORY (INHALATION) at 09:46

## 2018-06-14 RX ADMIN — IPRATROPIUM BROMIDE SCH MG: 0.5 SOLUTION RESPIRATORY (INHALATION) at 05:15

## 2018-06-14 RX ADMIN — METHYLPREDNISOLONE SODIUM SUCCINATE SCH MG: 125 INJECTION, POWDER, FOR SOLUTION INTRAMUSCULAR; INTRAVENOUS at 03:48

## 2018-06-14 RX ADMIN — IPRATROPIUM BROMIDE SCH MG: 0.5 SOLUTION RESPIRATORY (INHALATION) at 15:11

## 2018-06-14 RX ADMIN — Medication SCH TAB: at 09:57

## 2018-06-14 RX ADMIN — METHYLPREDNISOLONE SODIUM SUCCINATE SCH MG: 125 INJECTION, POWDER, FOR SOLUTION INTRAMUSCULAR; INTRAVENOUS at 11:33

## 2018-06-14 RX ADMIN — HYDRALAZINE HYDROCHLORIDE PRN MG: 20 INJECTION INTRAMUSCULAR; INTRAVENOUS at 04:46

## 2018-06-14 RX ADMIN — Medication SCH MG: at 09:57

## 2018-06-14 RX ADMIN — METHYLPREDNISOLONE SODIUM SUCCINATE SCH MG: 125 INJECTION, POWDER, FOR SOLUTION INTRAMUSCULAR; INTRAVENOUS at 18:19

## 2018-06-14 RX ADMIN — METOROPROLOL TARTRATE PRN MG: 5 INJECTION, SOLUTION INTRAVENOUS at 10:39

## 2018-06-14 RX ADMIN — POTASSIUM CHLORIDE SCH MEQ: 1500 TABLET, EXTENDED RELEASE ORAL at 16:20

## 2018-06-14 RX ADMIN — Medication SCH EACH: at 09:47

## 2018-06-14 NOTE — PCM.PN
- General Info


Date of Service: 06/14/18


Admission Dx/Problem (Free Text): 


 Admission Diagnosis/Problem





Admission Diagnosis/Problem      Pneumonia








Subjective Update: 


In to see Kiesha today. She is sitting in a chair visiting with her daughter 

and grandchildren. I explained the results of her ECHO and that she came back 

positive for metapneumovirus- we will be D/Cing her antibiotics tomorrow as she 

does not need them for viral coverage. We will continue steroid taper. Overall 

she is doing well. No complaints, pain is controlled. She seems to be breathing 

better, and her nasal cannula O2 has gone down from 4L to 2L. She is still 

slightly SOB, but she states this is her baseline. Ambulating with assistance. 

PT/OT recommend home health, but if she does need to continue oxygen at home 

they would like to see her in rehabilitation as the O2 could be a hazard for 

walking with her poor eyesight. Appetite improving. Urinating- per pt she does 

have a little incontinence at times. No other concerns from nursing. She is 

anxious to get back home, but is open to going to rehabilitation. We are hoping 

to D/C tomorrow, pending placement options, clinical disposition and need for 

oxygen. 





Functional Status: Reports: Pain Controlled, Tolerating Diet, Ambulating, 

Urinating, Incentive Spirometry





- Review of Systems


General: Reports: Weakness (at baseline).  Denies: Fever, Chills


HEENT: Reports: Glasses, Other (hard of hearing)


Pulmonary: Reports: Shortness of Breath (at baseline), Cough, Wheezing.  Denies

: Pleuritic Chest Pain, Sputum


Cardiovascular: Reports: Dyspnea on Exertion, Orthopnea (improving), Edema (mild

, wears compression socks at home).  Denies: Chest Pain, Palpitations


Gastrointestinal: Reports: No Symptoms, Other (colostomy bag).  Denies: 

Abdominal Pain, Constipation, Diarrhea, Nausea, Vomiting


Genitourinary: Reports: No Symptoms.  Denies: Dysuria, Frequency, Burning, Pain

, Urgency


Musculoskeletal: Reports: No Symptoms


Skin: Reports: No Symptoms


Neurological: Reports: Difficulty Walking, Weakness (at baseline), Gait 

Disturbance (uses walker and is assisted with walking).  Denies: Numbness, 

Tingling, Trouble Speaking


Psychiatric: Reports: Other (Unmotivated to be active)





- Patient Data


Vitals - Most Recent: 


 Last Vital Signs











Temp  99.0 F   06/14/18 11:53


 


Pulse  78   06/14/18 16:21


 


Resp  20   06/14/18 07:43


 


BP  169/65 H  06/14/18 16:21


 


Pulse Ox  93 L  06/14/18 16:21











Weight - Most Recent: 148 lb 8 oz


I&O - Last 24 Hours: 


 Intake & Output











 06/14/18 06/14/18 06/14/18





 06:59 14:59 22:59


 


Intake Total 400 0 1210


 


Output Total 600  


 


Balance -200 0 1210











Lab Results Last 24 Hours: 


 Laboratory Results - last 24 hr











  06/14/18 06/14/18 06/14/18 Range/Units





  05:45 05:45 05:45 


 


WBC  7.59    (3.98-10.04)  K/mm3


 


RBC  4.45    (3.98-5.22)  M/mm3


 


Hgb  13.5    (11.2-15.7)  gm/L


 


Hct  40.1    (34.1-44.9)  %


 


MCV  90.1    (79.4-94.8)  fl


 


MCH  30.3    (25.6-32.2)  pg


 


MCHC  33.7    (32.2-35.5)  g/dl


 


RDW Std Deviation  46.2    (36.4-46.3)  fL


 


Plt Count  175 L    (182-369)  K/mm3


 


MPV  10.6    (9.4-12.3)  fl


 


Neut % (Auto)  85.8 H    (34.0-71.1)  %


 


Lymph % (Auto)  12.4 L    (19.3-51.7)  %


 


Mono % (Auto)  1.4 L    (4.7-12.5)  %


 


Eos % (Auto)  0 L    (0.7-5.8)  


 


Baso % (Auto)  0.3    (0.1-1.2)  %


 


Neut # (Auto)  6.51 H    (1.56-6.13)  K/mm3


 


Lymph # (Auto)  0.94 L    (1.18-3.74)  K/mm3


 


Mono # (Auto)  0.11 L    (0.24-0.36)  K/mm3


 


Eos # (Auto)  0.00 L    (0.04-0.36)  K/mm3


 


Baso # (Auto)  0.02    (0.01-0.08)  K/mm3


 


Manual Slide Review  Normal smear    


 


PT    30.5 H  (9.5-12.1)  SECONDS


 


INR    2.86  


 


Sodium   138   (136-145)  mEq/L


 


Potassium   4.6   (3.5-5.1)  mEq/L


 


Chloride   108 H   ()  mEq/L


 


Carbon Dioxide   21   (21-32)  mEq/L


 


Anion Gap   13.6   (5-15)  


 


BUN   35 H   (7-18)  mg/dL


 


Creatinine   1.2 H   (0.55-1.02)  mg/dL


 


Est Cr Clr Drug Dosing   30.92   mL/min


 


Estimated GFR (MDRD)   42   (>60)  mL/min


 


BUN/Creatinine Ratio   29.2 H   (14-18)  


 


Glucose   172 H   ()  mg/dL


 


Calcium   8.9   (8.5-10.1)  mg/dL


 


Magnesium   1.9   (1.8-2.4)  mg/dl


 


C-Reactive Protein   5.5 H*   (<1.0)  mg/dL











Jim Results Last 24 Hours: 


 Microbiology











 06/12/18 14:50 Aerobic Blood Culture - Preliminary





 Blood    NO GROWTH AFTER 2 DAYS





 Anaerobic Blood Culture - Preliminary





    NO GROWTH AFTER 2 DAYS


 


 06/12/18 15:04 Aerobic Blood Culture - Preliminary





 Blood    NO GROWTH AFTER 2 DAYS





 Anaerobic Blood Culture - Preliminary





    NO GROWTH AFTER 2 DAYS


 


 06/12/18 12:00 Gram Stain - Final





 Sputum - Expectorated Sputum Culture - Final


 


 06/13/18 05:00 Respiratory Virus Panel (PCR) - Final





 Nasopharyngeal Swab 


 


 06/13/18 00:55 Streptococcus pneumoniae Antigen (M - Final





 Urine 











Med Orders - Current: 


 Current Medications





Acetaminophen (Tylenol)  650 mg PO Q4H PRN


   PRN Reason: Pain (Mild 1-3)/fever


Amlodipine Besylate (Norvasc)  10 mg PO DAILY [facility]


   Last Admin: 06/14/18 09:58 Dose:  10 mg


Aspirin (Aspirin)  81 mg PO DAILY [facility]


   Last Admin: 06/14/18 09:59 Dose:  81 mg


Benazepril HCl (Lotensin)  40 mg PO DAILY [facility]


   Last Admin: 06/14/18 09:58 Dose:  40 mg


Benzonatate (Tessalon Perles)  100 mg PO TID PRN


   PRN Reason: Cough


   Last Admin: 06/12/18 20:54 Dose:  100 mg


Bisacodyl (Dulcolax)  5 mg PO DAILY PRN


   PRN Reason: Constipation


Bupropion HCl (Wellbutrin Sr)  100 mg PO BID [facility]


   Last Admin: 06/14/18 09:59 Dose:  100 mg


Docusate Sodium (Colace)  100 mg PO BID PRN


   PRN Reason: Constipation


Furosemide (Lasix)  40 mg PO DAILY [facility]


   Last Admin: 06/14/18 09:59 Dose:  40 mg


Guaifenesin/Phenylephrine HCl (Robitussin Dm)  5 ml PO Q4H PRN


   PRN Reason: Cough


Hydralazine HCl (Apresoline)  10 mg IVPUSH Q4H PRN


   PRN Reason: Hypertension


   Last Admin: 06/14/18 04:46 Dose:  10 mg


Hydromorphone HCl (Dilaudid)  0.25 mg IVPUSH Q2H PRN


   PRN Reason: Pain (severe 7-10)


Promethazine HCl 6.25 mg/ (Sodium Chloride)  50.25 mls @ 100 mls/hr IV Q6H PRN


   PRN Reason: Nausea/Vomiting


Ceftriaxone Sodium 1 gm/ (Sodium Chloride)  100 mls @ 200 mls/hr IV DAILY [facility]


   Stop: 06/18/18 09:01


   Last Admin: 06/14/18 09:56 Dose:  200 mls/hr


Azithromycin 500 mg/ Sodium (Chloride)  250 mls @ 250 mls/hr IV Q24H [facility]


   Stop: 06/15/18 09:59


   Last Admin: 06/14/18 09:59 Dose:  250 mls/hr


Ipratropium Bromide (Atrovent)  0.5 mg NEB QIDRT [facility]


   Last Admin: 06/14/18 15:11 Dose:  0.5 mg


Levothyroxine Sodium (Synthroid)  88 mcg PO ACBREAKFAST [facility]


   Last Admin: 06/14/18 06:30 Dose:  88 mcg


Magnesium Hydroxide (Milk Of Magnesia)  30 ml PO Q12H PRN


   PRN Reason: Constipation


Methylprednisolone Sodium Succinate (Solu-Medrol)  125 mg IVPUSH Q8H [facility]


   Last Admin: 06/14/18 11:33 Dose:  125 mg


Metoprolol Tartrate (Lopressor)  5 mg IVPUSH Q4H PRN


   PRN Reason: Tachycardia


   Last Admin: 06/14/18 10:39 Dose:  5 mg


Metoprolol Tartrate (Lopressor)  50 mg PO TID [facility]


   Last Admin: 06/14/18 16:20 Dose:  50 mg


Mirtazapine (Remeron)  15 mg PO DAILY [facility]


   Last Admin: 06/14/18 09:59 Dose:  15 mg


Oxycodone/Acetaminophen (Percocet 325-5 Mg)  1 tab PO Q4H PRN


   PRN Reason: Pain (moderate 4-6)


Tiotropium Br/Olodaterol Hcl [ Stiolto Respimat Inhal Spra  0 each INH DAILY [facility]


   Last Admin: 06/14/18 09:47 Dose:  1 each


Polyethylene Glycol (Miralax)  17 gm PO DAILY PRN


   PRN Reason: Constipation


Potassium Chloride (Klor-Con M20)  20 meq PO BIDMEALS [facility]


   Last Admin: 06/14/18 16:20 Dose:  20 meq


Promethazine HCl (Phenergan)  25 mg PO Q6H PRN


   PRN Reason: Nausea/Vomiting


Saccharomyces Boulardii (Florastor)  250 mg PO DAILY [facility]


   Last Admin: 06/14/18 09:57 Dose:  250 mg


Senna/Docusate Sodium (Senna Plus)  1 tab PO BID PRN


   PRN Reason: Constipation


Sodium Chloride (Saline Flush)  10 ml FLUSH ASDIRECTED PRN


   PRN Reason: Keep Vein Open


   Last Admin: 06/12/18 15:05 Dose:  10 ml


Temazepam (Restoril)  7.5 mg PO BEDTIME PRN


   PRN Reason: Sleep


Vit A/Vit C/Vit E/Selen/Cu/Zn/Lutei (Icaps Mv)  1 tab PO DAILY [facility]


   Last Admin: 06/14/18 09:57 Dose:  1 tab


Warfarin Sodium (Pharmacy To Dose - Warfarin)  1 dose .XX ASDIRECTED [facility]


Warfarin Sodium (Coumadin)  2 mg PO [id number] [facility]


Warfarin Sodium (Coumadin)  1 mg PO [facility]





Discontinued Medications





Albuterol/Ipratropium (Duoneb 3.0-0.5 Mg/3 Ml)  3 ml NEB ONETIME ONE


   Stop: 06/12/18 14:03


   Last Admin: 06/12/18 14:14 Dose:  3 ml


Albuterol/Ipratropium (Duoneb 3.0-0.5 Mg/3 Ml)  3 ml NEB ONETIME ONE


   Stop: 06/12/18 16:54


   Last Admin: 06/12/18 17:04 Dose:  3 ml


Albuterol/Ipratropium (Duoneb 3.0-0.5 Mg/3 Ml)  3 ml NEB Q4H PRN


   PRN Reason: Shortness Of Breath/wheezing


   Last Admin: 06/13/18 08:20 Dose:  3 ml


Albuterol/Ipratropium (Duoneb 3.0-0.5 Mg/3 Ml)  3 ml NEB QID [facility]


Albuterol/Ipratropium (Duoneb 3.0-0.5 Mg/3 Ml)  3 ml NEB QIDRT [facility]


Bupropion HCl (Wellbutrin Sr)  100 mg PO BID ONE


   Stop: 06/13/18 15:14


   Last Admin: 06/13/18 16:26 Dose:  100 mg


Bupropion HCl (Wellbutrin Sr)  100 mg PO BID [facility]


Levofloxacin/Dextrose 750 mg/ (Premix)  150 mls @ 100 mls/hr IV ONETIME ONE


   Stop: 06/12/18 16:25


   Last Admin: 06/12/18 15:40 Dose:  100 mls/hr


Azithromycin 500 mg/ Sodium (Chloride)  250 mls @ 250 mls/hr IV ONETIME ONE


   Stop: 06/13/18 09:59


Azithromycin 250 mg/ Sodium (Chloride)  250 mls @ 250 mls/hr IV Q24H [facility]


   Stop: 06/16/18 19:31


Azithromycin 500 mg/ Sodium (Chloride)  250 mls @ 250 mls/hr IV ONETIME ONE


   Stop: 06/13/18 09:59


   Last Admin: 06/13/18 09:35 Dose:  250 mls/hr


Ipratropium Bromide (Atrovent)  0.5 mg NEB QID [facility]


Metoprolol Tartrate (Lopressor)  50 mg PO BID [facility]


   Last Admin: 06/13/18 09:34 Dose:  50 mg


Non-Formulary Medication (Melatonin)  3 mg PO BEDTIME [facility]


   Last Admin: 06/13/18 19:51 Dose:  Not Given


Prednisone (Prednisone)  40 mg PO DAILY [facility]


   Stop: 06/17/18 19:16


   Last Admin: 06/13/18 12:13 Dose:  Not Given


Warfarin Sodium (Coumadin)  1 mg PO ONETIME ONE


   Stop: 06/12/18 19:31


   Last Admin: 06/12/18 20:55 Dose:  1 mg


Warfarin Sodium (Coumadin)  2 mg PO ONETIME ONE


   Stop: 06/13/18 18:01


   Last Admin: 06/13/18 18:08 Dose:  2 mg











- Exam


Quality Assessment: Supplemental Oxygen (2L nasal cannula), DVT Prophylaxis


General: Alert, Oriented, Cooperative, No Acute Distress


HEENT: Pupils Equal, Pupils Reactive, EOMI, Mucous Membr. Moist/Pink


Neck: Supple


Lungs: Decreased Breath Sounds, Crackles, Rales, Wheezing


Cardiovascular: Regular Rate, Irregular Rhythm (afib history)


GI/Abdominal Exam: Normal Bowel Sounds, Soft, Non-Tender, No Organomegaly, No 

Distention, No Abnormal Bruit, No Mass, Pelvis Stable


 (Female) Exam: Deferred


Back Exam: Normal Inspection, Decreased Range of Motion


Extremities: Normal Inspection, Normal Range of Motion, Non-Tender, Normal 

Capillary Refill, Pedal Edema (slight, has compression stockings on )


Peripheral Pulses: 2+: Posterior Tibial (L), Posterior Tibial (R), Dorsalis 

Pedis (L), Dorsalis Pedis (R)


Skin: Warm, Dry, Intact


Neurological: No New Focal Deficit, Strength Equal Bilateral, Cranial Nerves 

Intact (grossly)


Psy/Mental Status: Alert, Normal Affect, Normal Mood





- Problem List & Annotations


(1) Pneumonia


SNOMED Code(s): 117168262


   Code(s): J18.9 - PNEUMONIA, UNSPECIFIED ORGANISM   Status: Acute   Priority: 

High   Current Visit: Yes   


Qualifiers: 


   Pneumonia type: due to unspecified organism   Laterality: right   Lung 

location: lower lobe of lung   Qualified Code(s): J18.1 - Lobar pneumonia, 

unspecified organism   





(2) Acute hypoxemic respiratory failure


SNOMED Code(s): 044317942


   Code(s): J96.01 - ACUTE RESPIRATORY FAILURE WITH HYPOXIA   Status: Acute   

Priority: High   Current Visit: Yes   





(3) Subtherapeutic international normalized ratio (INR)


SNOMED Code(s): 882854260, 053484602


   Code(s): R79.1 - ABNORMAL COAGULATION PROFILE   Status: Acute   Priority: 

High   Current Visit: Yes   





(4) Weakness


SNOMED Code(s): 48625668


   Code(s): R53.1 - WEAKNESS   Status: Acute   Priority: Medium   Current Visit

: Yes   





(5) Hypothyroid


SNOMED Code(s): 20132860


   Code(s): E03.9 - HYPOTHYROIDISM, UNSPECIFIED   Status: Chronic   Priority: 

Low   Current Visit: Yes   


Qualifiers: 


   Hypothyroidism type: unspecified   Qualified Code(s): E03.9 - Hypothyroidism

, unspecified   





(6) COPD (chronic obstructive pulmonary disease)


SNOMED Code(s): 91588963


   Code(s): J44.9 - CHRONIC OBSTRUCTIVE PULMONARY DISEASE, UNSPECIFIED   Status

: Chronic   Priority: Medium   Current Visit: Yes   


Qualifiers: 


   COPD type: emphysema   Emphysema type: unspecified   Qualified Code(s): 

J43.9 - Emphysema, unspecified   





(7) Afib


SNOMED Code(s): 61099303


   Code(s): I48.91 - UNSPECIFIED ATRIAL FIBRILLATION   Status: Acute   Priority

: High   Current Visit: Yes   


Qualifiers: 


   Atrial fibrillation type: unspecified   Qualified Code(s): I48.91 - 

Unspecified atrial fibrillation   





(8) Heart failure with preserved ejection fraction


SNOMED Code(s): 66772520


   Code(s): I50.30 - UNSPECIFIED DIASTOLIC (CONGESTIVE) HEART FAILURE   Status: 

Chronic   Priority: Medium   Current Visit: Yes   





- Problem List Review


Problem List Initiated/Reviewed/Updated: Yes





- My Orders


Last 24 Hours: 


My Active Orders





06/13/18 Dinner


Heart Healthy Diet [DIET] 





06/14/18 09:00


Azithromycin [Zithromax] 500 mg   Sodium Chloride 0.9% [Normal Saline] 250 ml 

IV Q24H 


buPROPion [Wellbutrin SR]   100 mg PO BID 





06/15/18 05:11


BASIC METABOLIC PANEL,BMP [CHEM] AM 


C-REACTIVE PROTEIN [CHEM] AM 


CBC WITH AUTO DIFF [HEME] AM 


MAGNESIUM [CHEM] AM 





06/16/18 05:11


BASIC METABOLIC PANEL,BMP [CHEM] AM 


C-REACTIVE PROTEIN [CHEM] AM 


CBC WITH AUTO DIFF [HEME] AM 


MAGNESIUM [CHEM] AM 





06/17/18 05:11


BASIC METABOLIC PANEL,BMP [CHEM] AM 


C-REACTIVE PROTEIN [CHEM] AM 


CBC WITH AUTO DIFF [HEME] AM 


MAGNESIUM [CHEM] AM 














- Plan


Plan:: 


I/P: 





Acute:





Community Acquired Pneumonia, viral 


   - Metapneumovirus POSITIVE 


   - Risk Factors: COPD, elderly, previous hospitalization for pneumonia 


   - Pneumovax 6/2009, Prevnar 8/2015


   - CURB 65 Score is 2 points:  6.8-30% mortality with recommendation to 

consider inpatient 


   - PSI/PORT Score is 107:  8.2-9.3% mortality with hospitalization 

recommended based on risk  


   - She does not meet criteria for Sepsis/SIRS at this time 


   - ED CXR 6/12/18 initial report--> possible right sided PNA


   - Afebrile and no leukocytosis --> does take Tylenol daily which could be 

affecting her temperature 


   - CRP 9.4-->9.6-->  


   - Sputum Cx, Respiratory Viral Panel ordered


   - Mycoplasma pneumoniae and Strep pneumoniae negative 


   - SLP consult--> no dysphagia; ruled out aspiration PNA


   - IV Levaquin 750 mg IV daily given in ED--> D/C


   - Azithromycin 500 mg IV x 3 days and Rocephin 1 gm IV QDaily x5 days--> D/C 

Rocephin since viral


      -Patient reports rash with PCN previously but states that she has had 

PCNs since that episode and has not had any reactions 


   - Solumedrol 125mg Q8h--> taper to 80mg tomorrow 


   - Decongestant and Expectorant Q4H PRN 


   - Supplemental O2 and breathing treatments PRN


   - Serial CXR as indicated and RT/IS/DuoNebs/Acapella as directed 


   - Recommend outpatient PFTs 





Heart Failure, preserved ejection fraction


   - BNP 3935 


   - Continue home meds--> Benazepril 40 mg daily, furosemide 40 mg daily, 

metoprolol tartrate 50 mg BID


   - 2D Echo-->LVEF 55%, Severe biatrial dilation, mild mitral and tricuspid 

regurg, mild to mod aortic valve regurg


   - Heart Healthy diet 


   - Dietitian consult 


   - F/U with PCP and cardiologist 





Subtherapeutic INR, acute on chronic


   - hx of afib


   - Pt states she has been subtherapeutic chronically


   - INR 1.89--> 2.2--> 2.86


   - she is on warfarin 1 mg q Tuesday and 2 mg daily 


   - Pharmacy to adjust warfarin 


   - May want to consider starting Eliquis or Xaralto; F/U with PCP





Weakness


   - exacerbated by current illness


   - uses a walker and cane at home but states she is usually independent


   - PT/OT consult--> recommend home health


 


Chronic: 


Afib--> Lopressor increased from 5mg BID to TID


COPD


CHF


Rheumatic fever as toddler 


Hx/o Pneumonia in the past


Glaucoma, Cataracts


Recurrent UTI --> recent completion of 7 day treatment with last dose of Cipro 

yesterday, UA negative here


Hypothyroidism


OA


Rectal cancer with surgical intervention for which she now has a colostomy





Plan: 


Admitted from ED to Med/Surg with Tele


Other orders as indicated above 


CM/SW for discharge planning 


Routine AM labs 


Will order UA with hx of recurrent UTIs and recent completion of treatment 


Continue home meds 


Heart healthy diet  


Consult dietitian 


Consult PT/OT 


Consult SLP 


DVT Prophylaxis: She is on warfarin at home and also wears KURT hose; will 

continue these inpatient with SCDs


Ambulate as tolerated 


Code Status: CPR only  


PCP: Dr. Nereyda Rodriguez in [place], [place]

## 2018-06-14 NOTE — CR
Chest: Portable view of the chest was obtained.

 

Comparison: Prior chest x-ray of 06/12/18.

 

Heart is mildly enlarged.  Tortuous thoracic aorta is seen with 

atherosclerotic calcification.  Right-sided lung markings are mildly 

increased which appear fairly stable.  Atelectasis is seen within the 

left base.  Degenerative change is noted within the left shoulder.  

Bony structures are osteopenic.

 

Impression:

1.  Stable chest x-ray from previous exam.

 

Diagnostic code #3

## 2018-06-15 RX ADMIN — Medication SCH TAB: at 08:51

## 2018-06-15 RX ADMIN — IPRATROPIUM BROMIDE SCH MG: 0.5 SOLUTION RESPIRATORY (INHALATION) at 06:09

## 2018-06-15 RX ADMIN — IPRATROPIUM BROMIDE SCH MG: 0.5 SOLUTION RESPIRATORY (INHALATION) at 16:00

## 2018-06-15 RX ADMIN — IPRATROPIUM BROMIDE SCH MG: 0.5 SOLUTION RESPIRATORY (INHALATION) at 09:07

## 2018-06-15 RX ADMIN — IPRATROPIUM BROMIDE SCH MG: 0.5 SOLUTION RESPIRATORY (INHALATION) at 20:15

## 2018-06-15 RX ADMIN — METHYLPREDNISOLONE SODIUM SUCCINATE SCH MG: 125 INJECTION, POWDER, FOR SOLUTION INTRAMUSCULAR; INTRAVENOUS at 02:47

## 2018-06-15 RX ADMIN — METHYLPREDNISOLONE SODIUM SUCCINATE SCH MG: 125 INJECTION, POWDER, FOR SOLUTION INTRAMUSCULAR; INTRAVENOUS at 18:55

## 2018-06-15 RX ADMIN — METHYLPREDNISOLONE SODIUM SUCCINATE SCH MG: 125 INJECTION, POWDER, FOR SOLUTION INTRAMUSCULAR; INTRAVENOUS at 10:42

## 2018-06-15 RX ADMIN — POTASSIUM CHLORIDE SCH MEQ: 1500 TABLET, EXTENDED RELEASE ORAL at 06:00

## 2018-06-15 RX ADMIN — POTASSIUM CHLORIDE SCH MEQ: 1500 TABLET, EXTENDED RELEASE ORAL at 16:25

## 2018-06-15 RX ADMIN — Medication SCH EACH: at 09:09

## 2018-06-15 RX ADMIN — Medication SCH MG: at 08:54

## 2018-06-15 NOTE — PCM.PN
- General Info


Date of Service: 06/15/18


Admission Dx/Problem (Free Text): 


 Admission Diagnosis/Problem





Admission Diagnosis/Problem      Pneumonia








Subjective Update: 


In to see Kiesha today. She is sitting in a chair visiting with her daughter 

and grandchildren. Overall she is doing well. No complaints, pain is 

controlled. She seems to be breathing better, and her nasal cannula O2 has gone 

down from 2L to 1L. Ambulating with assistance. PT/OT recommend home health. 

Appetite improving. Urinating- per pt she does have a little incontinence at 

times. No other concerns from nursing. She will most likely be D/C'd on Monday 

with Home Health and per Case Management will be going to [place] for 

Assisted Living.


Functional Status: Reports: Pain Controlled, Tolerating Diet, Ambulating, 

Urinating





- Review of Systems


General: Reports: Weakness (baseline).  Denies: Fever, Chills


HEENT: Reports: Glasses, Other (hard of hearing and poor vision)


Pulmonary: Reports: Shortness of Breath (baseline), Wheezing.  Denies: 

Pleuritic Chest Pain, Cough, Sputum, Hemoptysis


Cardiovascular: Reports: Dyspnea on Exertion, Edema (minimal, wears compression 

socks at home).  Denies: Chest Pain, Palpitations


Gastrointestinal: Reports: Decreased Appetite (at baseline).  Denies: Abdominal 

Pain, Constipation, Diarrhea, Nausea, Vomiting


Genitourinary: Reports: No Symptoms.  Denies: Dysuria, Frequency, Burning, Pain

, Urgency


Musculoskeletal: Reports: No Symptoms


Skin: Reports: No Symptoms


Neurological: Reports: No Symptoms


Psychiatric: Reports: No Symptoms





- Patient Data


Vitals - Most Recent: 


 Last Vital Signs











Temp  98.4 F   06/15/18 08:15


 


Pulse  76   06/15/18 08:52


 


Resp  18   06/15/18 08:15


 


BP  142/76 H  06/15/18 08:54


 


Pulse Ox  90 L  06/15/18 09:09











Weight - Most Recent: 149 lb 12.8 oz


I&O - Last 24 Hours: 


 Intake & Output











 06/15/18 06/15/18 06/15/18





 06:59 14:59 22:59


 


Intake Total 500 480 


 


Balance 500 480 











Lab Results Last 24 Hours: 


 Laboratory Results - last 24 hr











  06/15/18 06/15/18 06/15/18 Range/Units





  05:55 05:55 05:55 


 


WBC  13.55 H    (3.98-10.04)  K/mm3


 


RBC  4.49    (3.98-5.22)  M/mm3


 


Hgb  13.5    (11.2-15.7)  gm/L


 


Hct  40.3    (34.1-44.9)  %


 


MCV  89.8    (79.4-94.8)  fl


 


MCH  30.1    (25.6-32.2)  pg


 


MCHC  33.5    (32.2-35.5)  g/dl


 


RDW Std Deviation  46.3    (36.4-46.3)  fL


 


Plt Count  217    (182-369)  K/mm3


 


MPV  10.6    (9.4-12.3)  fl


 


Neut % (Auto)  92.8 H    (34.0-71.1)  %


 


Lymph % (Auto)  5.0 L    (19.3-51.7)  %


 


Mono % (Auto)  1.8 L    (4.7-12.5)  %


 


Eos % (Auto)  0 L    (0.7-5.8)  


 


Baso % (Auto)  0.1    (0.1-1.2)  %


 


Neut # (Auto)  12.58 H    (1.56-6.13)  K/mm3


 


Lymph # (Auto)  0.68 L    (1.18-3.74)  K/mm3


 


Mono # (Auto)  0.24    (0.24-0.36)  K/mm3


 


Eos # (Auto)  0.00 L    (0.04-0.36)  K/mm3


 


Baso # (Auto)  0.01    (0.01-0.08)  K/mm3


 


Manual Slide Review  Abnormal smear    


 


PT    36.1 H  (9.5-12.1)  SECONDS


 


INR    3.39  


 


Sodium   138   (136-145)  mEq/L


 


Potassium   4.5   (3.5-5.1)  mEq/L


 


Chloride   106   ()  mEq/L


 


Carbon Dioxide   20 L   (21-32)  mEq/L


 


Anion Gap   16.5 H   (5-15)  


 


BUN   46 H   (7-18)  mg/dL


 


Creatinine   1.3 H   (0.55-1.02)  mg/dL


 


Est Cr Clr Drug Dosing   28.54   mL/min


 


Estimated GFR (MDRD)   39   (>60)  mL/min


 


BUN/Creatinine Ratio   35.4 H   (14-18)  


 


Glucose   165 H   ()  mg/dL


 


Calcium   8.7   (8.5-10.1)  mg/dL


 


Magnesium   2.0   (1.8-2.4)  mg/dl


 


C-Reactive Protein   3.1 H*   (<1.0)  mg/dL











Jim Results Last 24 Hours: 


 Microbiology











 06/12/18 14:50 Aerobic Blood Culture - Preliminary





 Blood    NO GROWTH AFTER 3 DAYS





 Anaerobic Blood Culture - Preliminary





    NO GROWTH AFTER 3 DAYS


 


 06/12/18 15:04 Aerobic Blood Culture - Preliminary





 Blood    NO GROWTH AFTER 3 DAYS





 Anaerobic Blood Culture - Preliminary





    NO GROWTH AFTER 3 DAYS


 


 06/12/18 12:00 Gram Stain - Final





 Sputum - Expectorated Sputum Culture - Final











Med Orders - Current: 


 Current Medications





Acetaminophen (Tylenol)  650 mg PO Q4H PRN


   PRN Reason: Pain (Mild 1-3)/fever


Amlodipine Besylate (Norvasc)  10 mg PO DAILY [facility]


   Last Admin: 06/15/18 08:54 Dose:  10 mg


Aspirin (Aspirin)  81 mg PO DAILY [facility]


   Last Admin: 06/15/18 08:51 Dose:  81 mg


Benazepril HCl (Lotensin)  40 mg PO DAILY [facility]


   Last Admin: 06/15/18 08:53 Dose:  40 mg


Benzonatate (Tessalon Perles)  100 mg PO TID PRN


   PRN Reason: Cough


   Last Admin: 06/12/18 20:54 Dose:  100 mg


Bisacodyl (Dulcolax)  5 mg PO DAILY PRN


   PRN Reason: Constipation


Bupropion HCl (Wellbutrin Sr)  100 mg PO BID [facility]


   Last Admin: 06/15/18 08:53 Dose:  100 mg


Docusate Sodium (Colace)  100 mg PO BID PRN


   PRN Reason: Constipation


Furosemide (Lasix)  40 mg PO DAILY [facility]


   Last Admin: 06/15/18 08:51 Dose:  40 mg


Guaifenesin/Phenylephrine HCl (Robitussin Dm)  5 ml PO Q4H PRN


   PRN Reason: Cough


Hydralazine HCl (Apresoline)  10 mg IVPUSH Q4H PRN


   PRN Reason: Hypertension


   Last Admin: 06/14/18 04:46 Dose:  10 mg


Hydromorphone HCl (Dilaudid)  0.25 mg IVPUSH Q2H PRN


   PRN Reason: Pain (severe 7-10)


Promethazine HCl 6.25 mg/ (Sodium Chloride)  50.25 mls @ 100 mls/hr IV Q6H PRN


   PRN Reason: Nausea/Vomiting


Ipratropium Bromide (Atrovent)  0.5 mg NEB QIDRT [facility]


   Last Admin: 06/15/18 09:07 Dose:  0.5 mg


Levothyroxine Sodium (Synthroid)  88 mcg PO ACBREAKFAST [facility]


   Last Admin: 06/15/18 05:59 Dose:  88 mcg


Magnesium Hydroxide (Milk Of Magnesia)  30 ml PO Q12H PRN


   PRN Reason: Constipation


Methylprednisolone Sodium Succinate (Solu-Medrol)  125 mg IVPUSH Q8H [facility]


   Last Admin: 06/15/18 10:42 Dose:  125 mg


Metoprolol Tartrate (Lopressor)  5 mg IVPUSH Q4H PRN


   PRN Reason: Tachycardia


   Last Admin: 06/14/18 10:39 Dose:  5 mg


Metoprolol Tartrate (Lopressor)  50 mg PO TID [facility]


   Last Admin: 06/15/18 08:52 Dose:  50 mg


Mirtazapine (Remeron)  15 mg PO DAILY [facility]


   Last Admin: 06/15/18 08:51 Dose:  15 mg


Oxycodone/Acetaminophen (Percocet 325-5 Mg)  1 tab PO Q4H PRN


   PRN Reason: Pain (moderate 4-6)


Tiotropium Br/Olodaterol Hcl [ Stiolto Respimat Inhal Spra  0 each INH DAILY [facility]


   Last Admin: 06/15/18 09:09 Dose:  1 each


Polyethylene Glycol (Miralax)  17 gm PO DAILY PRN


   PRN Reason: Constipation


Potassium Chloride (Klor-Con M20)  20 meq PO BIDMEALS [facility]


   Last Admin: 06/15/18 06:00 Dose:  20 meq


Promethazine HCl (Phenergan)  25 mg PO Q6H PRN


   PRN Reason: Nausea/Vomiting


Saccharomyces Boulardii (Florastor)  250 mg PO DAILY [facility]


   Last Admin: 06/15/18 08:54 Dose:  250 mg


Senna/Docusate Sodium (Senna Plus)  1 tab PO BID PRN


   PRN Reason: Constipation


Sodium Chloride (Saline Flush)  10 ml FLUSH ASDIRECTED PRN


   PRN Reason: Keep Vein Open


   Last Admin: 06/12/18 15:05 Dose:  10 ml


Temazepam (Restoril)  7.5 mg PO BEDTIME PRN


   PRN Reason: Sleep


Vit A/Vit C/Vit E/Selen/Cu/Zn/Lutei (Icaps Mv)  1 tab PO DAILY [facility]


   Last Admin: 06/15/18 08:51 Dose:  1 tab


Warfarin Sodium (Pharmacy To Dose - Warfarin)  1 dose .XX ASDIRECTED [facility]


Warfarin Sodium (Coumadin)  1 mg PO Tu [facility]


Warfarin Sodium (Coumadin)  2 mg PO SuMoWeThFrSa@1800 [facility]


Warfarin Sodium (Coumadin Sliding Scale)  0 each PO QPM [facility]


   Stop: 06/15/18 21:00





Discontinued Medications





Albuterol/Ipratropium (Duoneb 3.0-0.5 Mg/3 Ml)  3 ml NEB ONETIME ONE


   Stop: 06/12/18 14:03


   Last Admin: 06/12/18 14:14 Dose:  3 ml


Albuterol/Ipratropium (Duoneb 3.0-0.5 Mg/3 Ml)  3 ml NEB ONETIME ONE


   Stop: 06/12/18 16:54


   Last Admin: 06/12/18 17:04 Dose:  3 ml


Albuterol/Ipratropium (Duoneb 3.0-0.5 Mg/3 Ml)  3 ml NEB Q4H PRN


   PRN Reason: Shortness Of Breath/wheezing


   Last Admin: 06/13/18 08:20 Dose:  3 ml


Albuterol/Ipratropium (Duoneb 3.0-0.5 Mg/3 Ml)  3 ml NEB QID MICHELLE


Albuterol/Ipratropium (Duoneb 3.0-0.5 Mg/3 Ml)  3 ml NEB QIDRT [facility]


Bupropion HCl (Wellbutrin Sr)  100 mg PO BID ONE


   Stop: 06/13/18 15:14


   Last Admin: 06/13/18 16:26 Dose:  100 mg


Bupropion HCl (Wellbutrin Sr)  100 mg PO BID [facility]


Levofloxacin/Dextrose 750 mg/ (Premix)  150 mls @ 100 mls/hr IV ONETIME ONE


   Stop: 06/12/18 16:25


   Last Admin: 06/12/18 15:40 Dose:  100 mls/hr


Azithromycin 500 mg/ Sodium (Chloride)  250 mls @ 250 mls/hr IV ONETIME ONE


   Stop: 06/13/18 09:59


Azithromycin 250 mg/ Sodium (Chloride)  250 mls @ 250 mls/hr IV Q24H [facility]


   Stop: 06/16/18 19:31


Ceftriaxone Sodium 1 gm/ (Sodium Chloride)  100 mls @ 200 mls/hr IV DAILY [facility]


   Stop: 06/18/18 09:01


   Last Admin: 06/14/18 09:56 Dose:  200 mls/hr


Azithromycin 500 mg/ Sodium (Chloride)  250 mls @ 250 mls/hr IV Q24H [facility]


   Stop: 06/15/18 09:59


   Last Admin: 06/15/18 08:54 Dose:  250 mls/hr


Azithromycin 500 mg/ Sodium (Chloride)  250 mls @ 250 mls/hr IV ONETIME ONE


   Stop: 06/13/18 09:59


   Last Admin: 06/13/18 09:35 Dose:  250 mls/hr


Ipratropium Bromide (Atrovent)  0.5 mg NEB QID [facility]


Metoprolol Tartrate (Lopressor)  50 mg PO BID [facility]


   Last Admin: 06/13/18 09:34 Dose:  50 mg


Non-Formulary Medication (Melatonin)  3 mg PO BEDTIME [facility]


   Last Admin: 06/13/18 19:51 Dose:  Not Given


Prednisone (Prednisone)  40 mg PO DAILY [facility]


   Stop: 06/17/18 19:16


   Last Admin: 06/13/18 12:13 Dose:  Not Given


Warfarin Sodium (Coumadin)  1 mg PO ONETIME ONE


   Stop: 06/12/18 19:31


   Last Admin: 06/12/18 20:55 Dose:  1 mg


Warfarin Sodium (Coumadin)  2 mg PO ONETIME ONE


   Stop: 06/13/18 18:01


   Last Admin: 06/13/18 18:08 Dose:  2 mg


Warfarin Sodium (Coumadin)  2 mg PO SuMoWeThFrSa [facility]


   Last Admin: 06/14/18 18:20 Dose:  2 mg











- Exam


Quality Assessment: Supplemental Oxygen (1L nasal cannula)


General: Alert, Oriented, Cooperative, No Acute Distress


HEENT: Pupils Equal, Pupils Reactive, EOMI, Mucous Membr. Moist/Pink


Neck: Supple


Lungs: Normal Respiratory Effort, Decreased Breath Sounds, Crackles, Rales, 

Wheezing


Cardiovascular: Regular Rate, Irregular Rhythm (afib)


GI/Abdominal Exam: Normal Bowel Sounds, Soft, Non-Tender, No Organomegaly, No 

Distention, No Abnormal Bruit, No Mass, Pelvis Stable


 (Female) Exam: Deferred


Back Exam: Normal Inspection, Decreased Range of Motion


Extremities: Normal Inspection, Normal Range of Motion, Non-Tender, Normal 

Capillary Refill, Pedal Edema (minimal bilaterally, compression socks on)


Peripheral Pulses: 1+: Posterior Tibial (L), Posterior Tibial (R), Dorsalis 

Pedis (L), Dorsalis Pedis (R)


Skin: Warm, Dry, Intact


Neurological: No New Focal Deficit


Psy/Mental Status: Alert, Normal Affect, Normal Mood





- Problem List & Annotations


(1) Pneumonia


SNOMED Code(s): 743430677


   Code(s): J18.9 - PNEUMONIA, UNSPECIFIED ORGANISM   Status: Acute   Priority: 

High   Current Visit: Yes   


Qualifiers: 


   Pneumonia type: due to unspecified organism   Laterality: right   Lung 

location: lower lobe of lung   Qualified Code(s): J18.1 - Lobar pneumonia, 

unspecified organism   





(2) Acute hypoxemic respiratory failure


SNOMED Code(s): 509847297


   Code(s): J96.01 - ACUTE RESPIRATORY FAILURE WITH HYPOXIA   Status: Acute   

Priority: High   Current Visit: Yes   





(3) Subtherapeutic international normalized ratio (INR)


SNOMED Code(s): 794017684, 553306939


   Code(s): R79.1 - ABNORMAL COAGULATION PROFILE   Status: Acute   Priority: 

High   Current Visit: Yes   





(4) Weakness


SNOMED Code(s): 71322097


   Code(s): R53.1 - WEAKNESS   Status: Acute   Priority: Medium   Current Visit

: Yes   





(5) Hypothyroid


SNOMED Code(s): 50031673


   Code(s): E03.9 - HYPOTHYROIDISM, UNSPECIFIED   Status: Chronic   Priority: 

Low   Current Visit: Yes   


Qualifiers: 


   Hypothyroidism type: unspecified   Qualified Code(s): E03.9 - Hypothyroidism

, unspecified   





(6) COPD (chronic obstructive pulmonary disease)


SNOMED Code(s): 03089158


   Code(s): J44.9 - CHRONIC OBSTRUCTIVE PULMONARY DISEASE, UNSPECIFIED   Status

: Chronic   Priority: Medium   Current Visit: Yes   


Qualifiers: 


   COPD type: emphysema   Emphysema type: unspecified   Qualified Code(s): 

J43.9 - Emphysema, unspecified   





(7) Afib


SNOMED Code(s): 01400643


   Code(s): I48.91 - UNSPECIFIED ATRIAL FIBRILLATION   Status: Acute   Priority

: High   Current Visit: Yes   


Qualifiers: 


   Atrial fibrillation type: unspecified   Qualified Code(s): I48.91 - 

Unspecified atrial fibrillation   





(8) Heart failure with preserved ejection fraction


SNOMED Code(s): 55447435


   Code(s): I50.30 - UNSPECIFIED DIASTOLIC (CONGESTIVE) HEART FAILURE   Status: 

Chronic   Priority: Medium   Current Visit: Yes   





- Problem List Review


Problem List Initiated/Reviewed/Updated: Yes





- My Orders


Last 24 Hours: 


My Active Orders





06/16/18 05:11


BASIC METABOLIC PANEL,BMP [CHEM] AM 


C-REACTIVE PROTEIN [CHEM] AM 


CBC WITH AUTO DIFF [HEME] AM 


MAGNESIUM [CHEM] AM 





06/17/18 05:11


BASIC METABOLIC PANEL,BMP [CHEM] AM 


C-REACTIVE PROTEIN [CHEM] AM 


CBC WITH AUTO DIFF [HEME] AM 


MAGNESIUM [CHEM] AM 














- Plan


Plan:: 


I/P: 





Acute:





Community Acquired Pneumonia, viral -Improving


   - Metapneumovirus POSITIVE 


   - Risk Factors: COPD, elderly, previous hospitalization for pneumonia 


   - Pneumovax 6/2009, Prevnar 8/2015


   - CURB 65 Score is 2 points:  6.8-30% mortality with recommendation to 

consider inpatient 


   - PSI/PORT Score is 107:  8.2-9.3% mortality with hospitalization 

recommended based on risk  


   - She does not meet criteria for Sepsis/SIRS at this time 


   - ED CXR 6/12/18 initial report--> possible right sided PNA


   - Afebrile and no leukocytosis --> does take Tylenol daily which could be 

affecting her temperature 


   - CRP 9.6--> 3.1


   - Sputum Cx, Respiratory Viral Panel ordered


   - Mycoplasma pneumoniae and Strep pneumoniae negative 


   - SLP consult--> no dysphagia; ruled out aspiration PNA


   - IV Levaquin 750 mg IV daily given in ED--> D/C


   - Azithromycin 500 mg IV x 3 days and Rocephin 1 gm IV QDaily x5 days--> D/C 

Rocephin since viral, D/C Azithomycin today


      -Patient reports rash with PCN previously but states that she has had 

PCNs since that episode and has not had any reactions 


   - Solumedrol 125mg Q8h--> taper to 80mg tomorrow 


   - Decongestant and Expectorant Q4H PRN 


   - Supplemental O2 and breathing treatments PRN


   - Serial CXR as indicated and RT/IS/DuoNebs/Acapella as directed 


   - Recommend outpatient PFTs 





Heart Failure, preserved ejection fraction


   - BNP 3935 


   - Continue home meds--> Benazepril 40 mg daily, furosemide 40 mg daily, 

metoprolol tartrate 50 mg BID


   - 2D Echo-->LVEF 55%, Severe biatrial dilation, mild mitral and tricuspid 

regurg, mild to mod aortic valve regurg


   - Heart Healthy diet 


   - Dietitian consult 


   - F/U with PCP and cardiologist 





Weakness


   - exacerbated by current illness


   - uses a walker and cane at home but states she is usually independent


   - PT/OT consult--> recommend home health





Resolved:


Subtherapeutic INR, acute on chronic


   - hx of afib


   - Pt states she has been subtherapeutic chronically


   - INR 1.89--> 2.2--> 2.86


   - she is on warfarin 1 mg q Tuesday and 2 mg daily 


   - Pharmacy to adjust warfarin 


   - May want to consider starting Eliquis or Xaralto; F/U with PCP





 


Chronic: 


Afib--> Lopressor increased from 5mg BID to TID


COPD


CHF


Rheumatic fever as toddler 


Hx/o Pneumonia in the past


Glaucoma, Cataracts


Recurrent UTI --> recent completion of 7 day treatment with last dose of Cipro 

yesterday, UA negative here


Hypothyroidism


OA


Rectal cancer with surgical intervention for which she now has a colostomy





Plan: 


Admitted from ED to Med/Surg with Tele


Other orders as indicated above 


CM/SW for discharge planning 


Routine AM labs 


Will order UA with hx of recurrent UTIs and recent completion of treatment 


Continue home meds 


Heart healthy diet  


Consult dietitian 


Consult PT/OT 


Consult SLP 


DVT Prophylaxis: She is on warfarin at home and also wears KURT hose; will 

continue these inpatient with SCDs


Ambulate as tolerated 


Code Status: CPR only  


PCP: Dr. Nereyda Rodriguez in [place], [place] 


Most likely D/C Monday with Home Health pending clinical disposition- would 

like to D/C when off of oxygen. Plan is for [place] [facility] next Thursday.





LOS >96 hours due to slow response to treatment.

## 2018-06-16 RX ADMIN — HYDRALAZINE HYDROCHLORIDE PRN MG: 20 INJECTION INTRAMUSCULAR; INTRAVENOUS at 18:54

## 2018-06-16 RX ADMIN — IPRATROPIUM BROMIDE SCH MG: 0.5 SOLUTION RESPIRATORY (INHALATION) at 09:08

## 2018-06-16 RX ADMIN — IPRATROPIUM BROMIDE SCH MG: 0.5 SOLUTION RESPIRATORY (INHALATION) at 21:06

## 2018-06-16 RX ADMIN — IPRATROPIUM BROMIDE SCH MG: 0.5 SOLUTION RESPIRATORY (INHALATION) at 06:11

## 2018-06-16 RX ADMIN — Medication SCH MG: at 08:42

## 2018-06-16 RX ADMIN — IPRATROPIUM BROMIDE SCH MG: 0.5 SOLUTION RESPIRATORY (INHALATION) at 15:12

## 2018-06-16 RX ADMIN — METHYLPREDNISOLONE SODIUM SUCCINATE SCH MG: 125 INJECTION, POWDER, FOR SOLUTION INTRAMUSCULAR; INTRAVENOUS at 12:00

## 2018-06-16 RX ADMIN — POTASSIUM CHLORIDE SCH MEQ: 1500 TABLET, EXTENDED RELEASE ORAL at 08:45

## 2018-06-16 RX ADMIN — POTASSIUM CHLORIDE SCH MEQ: 1500 TABLET, EXTENDED RELEASE ORAL at 17:49

## 2018-06-16 RX ADMIN — Medication SCH TAB: at 08:43

## 2018-06-16 RX ADMIN — METHYLPREDNISOLONE SODIUM SUCCINATE SCH MG: 125 INJECTION, POWDER, FOR SOLUTION INTRAMUSCULAR; INTRAVENOUS at 18:05

## 2018-06-16 RX ADMIN — METHYLPREDNISOLONE SODIUM SUCCINATE SCH MG: 125 INJECTION, POWDER, FOR SOLUTION INTRAMUSCULAR; INTRAVENOUS at 02:52

## 2018-06-16 RX ADMIN — Medication SCH EACH: at 09:08

## 2018-06-16 NOTE — PCM.PN
- General Info


Date of Service: 06/16/18


Subjective Update: 











Patient was seen with her RN provider and daughter, Allegra.  The patient was 

more confused this am, this change has occurred since the finalization of the 

discharge.  There are no complaints.


Functional Status: Reports: Pain Controlled, Tolerating Diet, Urinating





- Review of Systems


General: Reports: No Symptoms


HEENT: Reports: No Symptoms


Pulmonary: Reports: No Symptoms


Cardiovascular: Reports: No Symptoms


Gastrointestinal: Reports: No Symptoms


Genitourinary: Reports: No Symptoms


Musculoskeletal: Reports: No Symptoms


Skin: Reports: No Symptoms


Neurological: Reports: No Symptoms


Psychiatric: Reports: Confusion





- Patient Data


Vitals - Most Recent: 


 Last Vital Signs











Temp  36.6 C   06/16/18 08:19


 


Pulse  86   06/16/18 11:00


 


Resp  18   06/16/18 08:19


 


BP  151/93 H  06/16/18 08:44


 


Pulse Ox  90 L  06/16/18 11:00











Weight - Most Recent: 69.4 kg


I&O - Last 24 Hours: 


 Intake & Output











 06/15/18 06/16/18 06/16/18





 22:59 06:59 14:59


 


Intake Total 1430 300 120


 


Output Total 50  


 


Balance 1380 300 120











Lab Results Last 24 Hours: 


 Laboratory Results - last 24 hr











  06/16/18 06/16/18 06/16/18 Range/Units





  05:59 05:59 05:59 


 


WBC  9.84    (3.98-10.04)  K/mm3


 


RBC  4.31    (3.98-5.22)  M/mm3


 


Hgb  12.8    (11.2-15.7)  gm/L


 


Hct  38.5    (34.1-44.9)  %


 


MCV  89.3    (79.4-94.8)  fl


 


MCH  29.7    (25.6-32.2)  pg


 


MCHC  33.2    (32.2-35.5)  g/dl


 


RDW Std Deviation  45.7    (36.4-46.3)  fL


 


Plt Count  225    (182-369)  K/mm3


 


MPV  10.5    (9.4-12.3)  fl


 


Neut % (Auto)  93.6 H    (34.0-71.1)  %


 


Lymph % (Auto)  4.1 L    (19.3-51.7)  %


 


Mono % (Auto)  1.7 L    (4.7-12.5)  %


 


Eos % (Auto)  0 L    (0.7-5.8)  


 


Baso % (Auto)  0.0 L    (0.1-1.2)  %


 


Neut # (Auto)  9.21 H    (1.56-6.13)  K/mm3


 


Lymph # (Auto)  0.40 L    (1.18-3.74)  K/mm3


 


Mono # (Auto)  0.17 L    (0.24-0.36)  K/mm3


 


Eos # (Auto)  0.00 L    (0.04-0.36)  K/mm3


 


Baso # (Auto)  0.00 L    (0.01-0.08)  K/mm3


 


Manual Slide Review  Abnormal smear    


 


PT    37.1 H  (9.5-12.1)  SECONDS


 


INR    3.49  


 


Sodium   140   (136-145)  mEq/L


 


Potassium   4.2   (3.5-5.1)  mEq/L


 


Chloride   107   ()  mEq/L


 


Carbon Dioxide   23   (21-32)  mEq/L


 


Anion Gap   14.2   (5-15)  


 


BUN   48 H   (7-18)  mg/dL


 


Creatinine   1.2 H   (0.55-1.02)  mg/dL


 


Est Cr Clr Drug Dosing   30.92   mL/min


 


Estimated GFR (MDRD)   42   (>60)  mL/min


 


BUN/Creatinine Ratio   40.0 H   (14-18)  


 


Glucose   173 H   ()  mg/dL


 


Calcium   8.6   (8.5-10.1)  mg/dL


 


Magnesium   2.1   (1.8-2.4)  mg/dl


 


C-Reactive Protein   1.3 H*   (<1.0)  mg/dL











Jim Results Last 24 Hours: 


 Microbiology











 06/12/18 14:50 Aerobic Blood Culture - Preliminary





 Blood    NO GROWTH AFTER 3 DAYS





 Anaerobic Blood Culture - Preliminary





    NO GROWTH AFTER 3 DAYS


 


 06/12/18 15:04 Aerobic Blood Culture - Preliminary





 Blood    NO GROWTH AFTER 3 DAYS





 Anaerobic Blood Culture - Preliminary





    NO GROWTH AFTER 3 DAYS











Med Orders - Current: 


 Current Medications





Acetaminophen (Tylenol)  650 mg PO Q4H PRN


   PRN Reason: Pain (Mild 1-3)/fever


Amlodipine Besylate (Norvasc)  10 mg PO DAILY [facility]


   Last Admin: 06/16/18 08:44 Dose:  10 mg


Aspirin (Aspirin)  81 mg PO DAILY [facility]


   Last Admin: 06/16/18 08:45 Dose:  81 mg


Benazepril HCl (Lotensin)  40 mg PO DAILY [facility]


   Last Admin: 06/16/18 08:44 Dose:  40 mg


Benzonatate (Tessalon Perles)  100 mg PO TID PRN


   PRN Reason: Cough


   Last Admin: 06/12/18 20:54 Dose:  100 mg


Bisacodyl (Dulcolax)  5 mg PO DAILY PRN


   PRN Reason: Constipation


Bupropion HCl (Wellbutrin Sr)  100 mg PO BID [facility]


   Last Admin: 06/16/18 08:43 Dose:  100 mg


Docusate Sodium (Colace)  100 mg PO BID PRN


   PRN Reason: Constipation


Furosemide (Lasix)  40 mg PO DAILY [facility]


   Last Admin: 06/16/18 08:45 Dose:  40 mg


Guaifenesin/Phenylephrine HCl (Robitussin Dm)  5 ml PO Q4H PRN


   PRN Reason: Cough


Hydralazine HCl (Apresoline)  10 mg IVPUSH Q4H PRN


   PRN Reason: Hypertension


   Last Admin: 06/14/18 04:46 Dose:  10 mg


Hydromorphone HCl (Dilaudid)  0.25 mg IVPUSH Q2H PRN


   PRN Reason: Pain (severe 7-10)


Promethazine HCl 6.25 mg/ (Sodium Chloride)  50.25 mls @ 100 mls/hr IV Q6H PRN


   PRN Reason: Nausea/Vomiting


Ipratropium Bromide (Atrovent)  0.5 mg NEB QIDRT [facility]


   Last Admin: 06/16/18 09:08 Dose:  0.5 mg


Levothyroxine Sodium (Synthroid)  88 mcg PO ACBREAKFAST [facility]


   Last Admin: 06/16/18 06:39 Dose:  88 mcg


Magnesium Hydroxide (Milk Of Magnesia)  30 ml PO Q12H PRN


   PRN Reason: Constipation


Methylprednisolone Sodium Succinate (Solu-Medrol)  125 mg IVPUSH Q8H [facility]


   Last Admin: 06/16/18 02:52 Dose:  125 mg


Metoprolol Tartrate (Lopressor)  5 mg IVPUSH Q4H PRN


   PRN Reason: Tachycardia


   Last Admin: 06/14/18 10:39 Dose:  5 mg


Metoprolol Tartrate (Lopressor)  50 mg PO TID [facility]


   Last Admin: 06/16/18 08:44 Dose:  Not Given


Mirtazapine (Remeron)  15 mg PO DAILY [facility]


   Last Admin: 06/16/18 08:45 Dose:  15 mg


Oxycodone/Acetaminophen (Percocet 325-5 Mg)  1 tab PO Q4H PRN


   PRN Reason: Pain (moderate 4-6)


Tiotropium Br/Olodaterol Hcl [ Stiolto Respimat Inhal Spra  0 each INH DAILY [facility]


   Last Admin: 06/16/18 09:08 Dose:  1 each


Polyethylene Glycol (Miralax)  17 gm PO DAILY PRN


   PRN Reason: Constipation


Potassium Chloride (Klor-Con M20)  20 meq PO BIDMEALS@0800,1700 [facility]


   Last Admin: 06/16/18 08:45 Dose:  20 meq


Promethazine HCl (Phenergan)  25 mg PO Q6H PRN


   PRN Reason: Nausea/Vomiting


Saccharomyces Boulardii (Florastor)  250 mg PO DAILY [facility]


   Last Admin: 06/16/18 08:42 Dose:  250 mg


Senna/Docusate Sodium (Senna Plus)  1 tab PO BID PRN


   PRN Reason: Constipation


Sodium Chloride (Saline Flush)  10 ml FLUSH ASDIRECTED PRN


   PRN Reason: Keep Vein Open


   Last Admin: 06/12/18 15:05 Dose:  10 ml


Temazepam (Restoril)  7.5 mg PO BEDTIME PRN


   PRN Reason: Sleep


Vit A/Vit C/Vit E/Selen/Cu/Zn/Lutei (Icaps Mv)  1 tab PO DAILY [facility]


   Last Admin: 06/16/18 08:43 Dose:  1 tab


Warfarin Sodium (Pharmacy To Dose - Warfarin)  1 dose .XX ASDIRECTED [facility]


Warfarin Sodium (Coumadin)  1 mg PO Tu [facility]


Warfarin Sodium (Coumadin)  2 mg PO SuMoWeThFrSa@1800 [facility]


Warfarin Sodium (Coumadin Sliding Scale)  0 each PO QPM [facility]


   Stop: 06/16/18 21:00





Discontinued Medications





Albuterol/Ipratropium (Duoneb 3.0-0.5 Mg/3 Ml)  3 ml NEB ONETIME ONE


   Stop: 06/12/18 14:03


   Last Admin: 06/12/18 14:14 Dose:  3 ml


Albuterol/Ipratropium (Duoneb 3.0-0.5 Mg/3 Ml)  3 ml NEB ONETIME ONE


   Stop: 06/12/18 16:54


   Last Admin: 06/12/18 17:04 Dose:  3 ml


Albuterol/Ipratropium (Duoneb 3.0-0.5 Mg/3 Ml)  3 ml NEB Q4H PRN


   PRN Reason: Shortness Of Breath/wheezing


   Last Admin: 06/13/18 08:20 Dose:  3 ml


Albuterol/Ipratropium (Duoneb 3.0-0.5 Mg/3 Ml)  3 ml NEB QID [facility]


Albuterol/Ipratropium (Duoneb 3.0-0.5 Mg/3 Ml)  3 ml NEB QIDRT [facility]


Bupropion HCl (Wellbutrin Sr)  100 mg PO BID ONE


   Stop: 06/13/18 15:14


   Last Admin: 06/13/18 16:26 Dose:  100 mg


Bupropion HCl (Wellbutrin Sr)  100 mg PO BID [facility]


Levofloxacin/Dextrose 750 mg/ (Premix)  150 mls @ 100 mls/hr IV ONETIME ONE


   Stop: 06/12/18 16:25


   Last Admin: 06/12/18 15:40 Dose:  100 mls/hr


Azithromycin 500 mg/ Sodium (Chloride)  250 mls @ 250 mls/hr IV ONETIME ONE


   Stop: 06/13/18 09:59


Azithromycin 250 mg/ Sodium (Chloride)  250 mls @ 250 mls/hr IV Q24H [facility]


   Stop: 06/16/18 19:31


Ceftriaxone Sodium 1 gm/ (Sodium Chloride)  100 mls @ 200 mls/hr IV DAILY [facility]


   Stop: 06/18/18 09:01


   Last Admin: 06/14/18 09:56 Dose:  200 mls/hr


Azithromycin 500 mg/ Sodium (Chloride)  250 mls @ 250 mls/hr IV Q24H [facility]


   Stop: 06/15/18 09:59


   Last Admin: 06/15/18 08:54 Dose:  250 mls/hr


Azithromycin 500 mg/ Sodium (Chloride)  250 mls @ 250 mls/hr IV ONETIME ONE


   Stop: 06/13/18 09:59


   Last Admin: 06/13/18 09:35 Dose:  250 mls/hr


Ipratropium Bromide (Atrovent)  0.5 mg NEB QID [facility]


Metoprolol Tartrate (Lopressor)  50 mg PO BID [facility]


   Last Admin: 06/13/18 09:34 Dose:  50 mg


Non-Formulary Medication (Melatonin)  3 mg PO BEDTIME [facility]


   Last Admin: 06/13/18 19:51 Dose:  Not Given


Potassium Chloride (Klor-Con M20)  20 meq PO BIDMEALS [facility]


   Last Admin: 06/15/18 16:25 Dose:  20 meq


Prednisone (Prednisone)  40 mg PO DAILY [facility]


   Stop: 06/17/18 19:16


   Last Admin: 06/13/18 12:13 Dose:  Not Given


Warfarin Sodium (Coumadin)  1 mg PO ONETIME ONE


   Stop: 06/12/18 19:31


   Last Admin: 06/12/18 20:55 Dose:  1 mg


Warfarin Sodium (Coumadin)  2 mg PO ONETIME ONE


   Stop: 06/13/18 18:01


   Last Admin: 06/13/18 18:08 Dose:  2 mg


Warfarin Sodium (Coumadin)  2 mg PO SuMoWeThFrSa [facility]


   Last Admin: 06/14/18 18:20 Dose:  2 mg


Warfarin Sodium (Coumadin)  2 mg PO SuMoWeThFrSa@1800 [facility]


Warfarin Sodium (Coumadin Sliding Scale)  0 each PO QPM [facility]


   Stop: 06/15/18 21:00


   Last Admin: 06/15/18 18:55 Dose:  Not Given











- Exam


Quality Assessment: Supplemental Oxygen, DVT Prophylaxis


General: Alert, Oriented, Cooperative


HEENT: Pupils Equal, Pupils Reactive, EOMI


Neck: Trachea Midline, No JVD


Lungs: Clear to Auscultation, Normal Respiratory Effort, Crackles


Cardiovascular: Regular Rate, Regular Rhythm


GI/Abdominal Exam: Normal Bowel Sounds, Soft, Non-Tender, No Organomegaly


 (Female) Exam: Normal External Exam


Back Exam: Normal Inspection


Extremities: Normal Inspection, Normal Capillary Refill


Skin: Warm


Neurological: No New Focal Deficit


Psy/Mental Status: Alert, Depressed





- Problem List Review


Problem List Initiated/Reviewed/Updated: Yes





- My Orders


Last 24 Hours: 


My Active Orders





06/16/18 18:00


Warfarin Sliding Scale [Coumadin Sliding Scale]   0 each PO QPM 





06/17/18 18:00


Warfarin [Coumadin]   2 mg PO SuMoWeThFrSa@1800 





06/19/18 18:00


Warfarin [Coumadin]   1 mg PO  














- Plan


Plan:: 


I/P: 





Acute:





Community Acquired Pneumonia, viral -Improving


   - Metapneumovirus POSITIVE 


   - Risk Factors: COPD, elderly, previous hospitalization for pneumonia 


   - Pneumovax 6/2009, Prevnar 8/2015


   - CURB 65 Score is 2 points:  6.8-30% mortality with recommendation to 

consider inpatient 


   - PSI/PORT Score is 107:  8.2-9.3% mortality with hospitalization 

recommended based on risk  


   - She does not meet criteria for Sepsis/SIRS at this time 


   - ED CXR 6/12/18 initial report--> possible right sided PNA


   - Afebrile and no leukocytosis --> does take Tylenol daily which could be 

affecting her temperature 


   - CRP 9.6--> 3.1


   - Sputum Cx, Respiratory Viral Panel ordered


   - Mycoplasma pneumoniae and Strep pneumoniae negative 


   - SLP consult--> no dysphagia; ruled out aspiration PNA


   - IV Levaquin 750 mg IV daily given in ED--> D/C


   - Azithromycin 500 mg IV x 3 days and Rocephin 1 gm IV QDaily x5 days--> D/C 

Rocephin since viral, D/C Azithomycin today


      -Patient reports rash with PCN previously but states that she has had 

PCNs since that episode and has not had any reactions 


   - Solumedrol 125mg Q8h--> taper to 80mg tomorrow 


   - Decongestant and Expectorant Q4H PRN 


   - Supplemental O2 and breathing treatments PRN


   - Serial CXR as indicated and RT/IS/DuoNebs/Acapella as directed 


   - Recommend outpatient PFTs 





Heart Failure, preserved ejection fraction


   - BNP 3935 


   - Continue home meds--> Benazepril 40 mg daily, furosemide 40 mg daily, 

metoprolol tartrate 50 mg BID


   - 2D Echo-->LVEF 55%, Severe biatrial dilation, mild mitral and tricuspid 

regurg, mild to mod aortic valve regurg


   - Heart Healthy diet 


   - Dietitian consult 


   - F/U with PCP and cardiologist 





Weakness


   - exacerbated by current illness


   - uses a walker and cane at home but states she is usually independent


   - PT/OT consult--> recommend home health





Resolved:


Subtherapeutic INR, acute on chronic


   - hx of afib


   - Pt states she has been subtherapeutic chronically


   - INR 1.89--> 2.2--> 2.86


   - she is on warfarin 1 mg q Tuesday and 2 mg daily 


   - Pharmacy to adjust warfarin 


   - May want to consider starting Eliquis or Xaralto; F/U with PCP





 


Chronic: 


Afib--> Lopressor increased from 5mg BID to TID


COPD


CHF


Rheumatic fever as toddler 


Hx/o Pneumonia in the past


Glaucoma, Cataracts


Recurrent UTI --> recent completion of 7 day treatment with last dose of Cipro 

yesterday, UA negative here


Hypothyroidism


OA


Rectal cancer with surgical intervention for which she now has a colostomy





Plan: 


Admitted from ED to Med/Surg with Tele


Other orders as indicated above 


CM/SW for discharge planning 


Routine AM labs 


Will order UA with hx of recurrent UTIs and recent completion of treatment 


Continue home meds 


Heart healthy diet  


Consult dietitian 


Consult PT/OT 


Consult SLP 


DVT Prophylaxis: She is on warfarin at home and also wears KURT hose; will 

continue these inpatient with SCDs


Ambulate as tolerated 


Code Status: CPR only  


PCP: Dr. Nereyda Rodriguez in [place], [place] 


Most likely D/C Monday with Home Health pending clinical disposition- would 

like to D/C when off of oxygen. Plan is for [facility] next Thursday.








**LOS >96 hours due to slow response to treatment.

## 2018-06-17 RX ADMIN — Medication SCH MG: at 09:18

## 2018-06-17 RX ADMIN — Medication SCH EACH: at 09:11

## 2018-06-17 RX ADMIN — IPRATROPIUM BROMIDE SCH MG: 0.5 SOLUTION RESPIRATORY (INHALATION) at 06:19

## 2018-06-17 RX ADMIN — IPRATROPIUM BROMIDE SCH MG: 0.5 SOLUTION RESPIRATORY (INHALATION) at 09:10

## 2018-06-17 RX ADMIN — METHYLPREDNISOLONE SODIUM SUCCINATE SCH MG: 125 INJECTION, POWDER, FOR SOLUTION INTRAMUSCULAR; INTRAVENOUS at 11:26

## 2018-06-17 RX ADMIN — HYDRALAZINE HYDROCHLORIDE PRN MG: 20 INJECTION INTRAMUSCULAR; INTRAVENOUS at 19:39

## 2018-06-17 RX ADMIN — POTASSIUM CHLORIDE SCH MEQ: 1500 TABLET, EXTENDED RELEASE ORAL at 17:15

## 2018-06-17 RX ADMIN — GUAIFENESIN AND DEXTROMETHORPHAN SCH ML: 100; 10 SYRUP ORAL at 08:35

## 2018-06-17 RX ADMIN — METHYLPREDNISOLONE SODIUM SUCCINATE SCH MG: 125 INJECTION, POWDER, FOR SOLUTION INTRAMUSCULAR; INTRAVENOUS at 20:06

## 2018-06-17 RX ADMIN — IPRATROPIUM BROMIDE SCH MG: 0.5 SOLUTION RESPIRATORY (INHALATION) at 15:14

## 2018-06-17 RX ADMIN — IPRATROPIUM BROMIDE SCH MG: 0.5 SOLUTION RESPIRATORY (INHALATION) at 20:35

## 2018-06-17 RX ADMIN — METHYLPREDNISOLONE SODIUM SUCCINATE SCH MG: 125 INJECTION, POWDER, FOR SOLUTION INTRAMUSCULAR; INTRAVENOUS at 03:32

## 2018-06-17 RX ADMIN — Medication SCH TAB: at 09:18

## 2018-06-17 RX ADMIN — POTASSIUM CHLORIDE SCH MEQ: 1500 TABLET, EXTENDED RELEASE ORAL at 09:17

## 2018-06-17 NOTE — PCM.PN
- General Info


Date of Service: 06/17/18


Functional Status: Reports: Tolerating Diet, Ambulating, Urinating





- Review of Systems


General: Reports: No Symptoms


HEENT: Reports: No Symptoms


Pulmonary: Reports: No Symptoms


Cardiovascular: Reports: No Symptoms


Gastrointestinal: Reports: No Symptoms


Genitourinary: Reports: No Symptoms


Musculoskeletal: Reports: No Symptoms


Skin: Reports: No Symptoms


Neurological: Reports: No Symptoms


Psychiatric: Reports: Depression





- Patient Data


Vitals - Most Recent: 


 Last Vital Signs











Temp  36.3 C   06/17/18 07:31


 


Pulse  83   06/17/18 09:17


 


Resp  26 H  06/17/18 07:31


 


BP  143/89 H  06/17/18 09:17


 


Pulse Ox  94 L  06/17/18 09:11











Weight - Most Recent: 68.765 kg


I&O - Last 24 Hours: 


 Intake & Output











 06/16/18 06/17/18 06/17/18





 22:59 06:59 14:59


 


Intake Total 1400 500 360


 


Output Total 400  


 


Balance 1000 500 360











Lab Results Last 24 Hours: 


 Laboratory Results - last 24 hr











  06/17/18 06/17/18 06/17/18 Range/Units





  06:06 06:06 06:06 


 


WBC  8.44    (3.98-10.04)  K/mm3


 


RBC  4.44    (3.98-5.22)  M/mm3


 


Hgb  13.3    (11.2-15.7)  gm/L


 


Hct  39.6    (34.1-44.9)  %


 


MCV  89.2    (79.4-94.8)  fl


 


MCH  30.0    (25.6-32.2)  pg


 


MCHC  33.6    (32.2-35.5)  g/dl


 


RDW Std Deviation  45.3    (36.4-46.3)  fL


 


Plt Count  249    (182-369)  K/mm3


 


MPV  10.4    (9.4-12.3)  fl


 


Neut % (Auto)  91.5 H    (34.0-71.1)  %


 


Lymph % (Auto)  3.3 L    (19.3-51.7)  %


 


Mono % (Auto)  3.3 L    (4.7-12.5)  %


 


Eos % (Auto)  0 L    (0.7-5.8)  


 


Baso % (Auto)  0.1    (0.1-1.2)  %


 


Neut # (Auto)  7.72 H    (1.56-6.13)  K/mm3


 


Lymph # (Auto)  0.28 L    (1.18-3.74)  K/mm3


 


Mono # (Auto)  0.28    (0.24-0.36)  K/mm3


 


Eos # (Auto)  0.00 L    (0.04-0.36)  K/mm3


 


Baso # (Auto)  0.01    (0.01-0.08)  K/mm3


 


Manual Slide Review  Abnormal smear    


 


PT    29.0 H  (9.5-12.1)  SECONDS


 


INR    2.71  


 


Sodium   140   (136-145)  mEq/L


 


Potassium   4.0   (3.5-5.1)  mEq/L


 


Chloride   107   ()  mEq/L


 


Carbon Dioxide   22   (21-32)  mEq/L


 


Anion Gap   15.0   (5-15)  


 


BUN   44 H   (7-18)  mg/dL


 


Creatinine   1.3 H   (0.55-1.02)  mg/dL


 


Est Cr Clr Drug Dosing   28.54   mL/min


 


Estimated GFR (MDRD)   39   (>60)  mL/min


 


BUN/Creatinine Ratio   33.8 H   (14-18)  


 


Glucose   179 H   ()  mg/dL


 


Calcium   8.7   (8.5-10.1)  mg/dL


 


Magnesium   2.1   (1.8-2.4)  mg/dl


 


C-Reactive Protein   0.8   (<1.0)  mg/dL











Jim Results Last 24 Hours: 


 Microbiology











 06/12/18 14:50 Aerobic Blood Culture - Preliminary





 Blood    NO GROWTH AFTER 4 DAYS





 Anaerobic Blood Culture - Preliminary





    NO GROWTH AFTER 4 DAYS


 


 06/12/18 15:04 Aerobic Blood Culture - Preliminary





 Blood    NO GROWTH AFTER 4 DAYS





 Anaerobic Blood Culture - Preliminary





    NO GROWTH AFTER 4 DAYS











Med Orders - Current: 


 Current Medications





Acetaminophen (Tylenol)  650 mg PO Q4H PRN


   PRN Reason: Pain (Mild 1-3)/fever


Amlodipine Besylate (Norvasc)  10 mg PO DAILY [facility]


   Last Admin: 06/17/18 09:17 Dose:  10 mg


Aspirin (Aspirin)  81 mg PO DAILY [facility]


   Last Admin: 06/17/18 09:18 Dose:  81 mg


Benazepril HCl (Lotensin)  40 mg PO DAILY [facility]


   Last Admin: 06/17/18 09:17 Dose:  40 mg


Benzonatate (Tessalon Perles)  100 mg PO TID PRN


   PRN Reason: Cough


   Last Admin: 06/12/18 20:54 Dose:  100 mg


Bisacodyl (Dulcolax)  5 mg PO DAILY PRN


   PRN Reason: Constipation


Bupropion HCl (Wellbutrin Sr)  100 mg PO BID [facility]


   Last Admin: 06/17/18 09:18 Dose:  100 mg


Docusate Sodium (Colace)  100 mg PO BID PRN


   PRN Reason: Constipation


Furosemide (Lasix)  40 mg PO DAILY [facility]


   Last Admin: 06/17/18 09:18 Dose:  40 mg


Guaifenesin (Mucinex)  600 mg PO BID [facility]


   Last Admin: 06/17/18 11:28 Dose:  600 mg


Guaifenesin/Phenylephrine HCl (Robitussin Dm)  5 ml PO Q4H PRN


   PRN Reason: Cough


Guaifenesin/Phenylephrine HCl (Robitussin Dm)  10 ml PO BID [facility]


Hydralazine HCl (Apresoline)  10 mg IVPUSH Q4H PRN


   PRN Reason: Hypertension


   Last Admin: 06/16/18 18:54 Dose:  10 mg


Hydromorphone HCl (Dilaudid)  0.25 mg IVPUSH Q2H PRN


   PRN Reason: Pain (severe 7-10)


Promethazine HCl 6.25 mg/ (Sodium Chloride)  50.25 mls @ 100 mls/hr IV Q6H PRN


   PRN Reason: Nausea/Vomiting


Ipratropium Bromide (Atrovent)  0.5 mg NEB QIDRT [facility]


   Last Admin: 06/17/18 09:10 Dose:  0.5 mg


Levothyroxine Sodium (Synthroid)  88 mcg PO ACBREAKFAST [facility]


   Last Admin: 06/17/18 06:31 Dose:  88 mcg


Magnesium Hydroxide (Milk Of Magnesia)  30 ml PO Q12H PRN


   PRN Reason: Constipation


Methylprednisolone Sodium Succinate (Solu-Medrol)  125 mg IVPUSH Q8H [facility]


   Last Admin: 06/17/18 11:26 Dose:  125 mg


Metoprolol Tartrate (Lopressor)  5 mg IVPUSH Q4H PRN


   PRN Reason: Tachycardia


   Last Admin: 06/14/18 10:39 Dose:  5 mg


Metoprolol Tartrate (Lopressor)  50 mg PO TID [facility]


   Last Admin: 06/17/18 09:17 Dose:  50 mg


Mirtazapine (Remeron)  15 mg PO DAILY [facility]


   Last Admin: 06/17/18 09:18 Dose:  15 mg


Oxycodone/Acetaminophen (Percocet 325-5 Mg)  1 tab PO Q4H PRN


   PRN Reason: Pain (moderate 4-6)


Tiotropium Br/Olodaterol Hcl [ Stiolto Respimat Inhal Spra  0 each INH DAILY [facility]


   Last Admin: 06/17/18 09:11 Dose:  1 each


Polyethylene Glycol (Miralax)  17 gm PO DAILY PRN


   PRN Reason: Constipation


Potassium Chloride (Klor-Con M20)  20 meq PO BIDMEALS@0800,1700 [facility]


   Last Admin: 06/17/18 09:17 Dose:  20 meq


Promethazine HCl (Phenergan)  25 mg PO Q6H PRN


   PRN Reason: Nausea/Vomiting


Saccharomyces Boulardii (Florastor)  250 mg PO DAILY [facility]


   Last Admin: 06/17/18 09:18 Dose:  250 mg


Senna/Docusate Sodium (Senna Plus)  1 tab PO BID PRN


   PRN Reason: Constipation


Sodium Chloride (Saline Flush)  10 ml FLUSH ASDIRECTED PRN


   PRN Reason: Keep Vein Open


   Last Admin: 06/12/18 15:05 Dose:  10 ml


Temazepam (Restoril)  7.5 mg PO BEDTIME PRN


   PRN Reason: Sleep


Vit A/Vit C/Vit E/Selen/Cu/Zn/Lutei (Icaps Mv)  1 tab PO DAILY [facility]


   Last Admin: 06/17/18 09:18 Dose:  1 tab


Warfarin Sodium (Pharmacy To Dose - Warfarin)  1 dose .XX ASDIRECTED [facility]


Warfarin Sodium (Coumadin)  1 mg PO [facility]


Warfarin Sodium (Coumadin)  2 mg PO SuMoWeThFrSa@1800 [facility]





Discontinued Medications





Albuterol/Ipratropium (Duoneb 3.0-0.5 Mg/3 Ml)  3 ml NEB ONETIME ONE


   Stop: 06/12/18 14:03


   Last Admin: 06/12/18 14:14 Dose:  3 ml


Albuterol/Ipratropium (Duoneb 3.0-0.5 Mg/3 Ml)  3 ml NEB ONETIME ONE


   Stop: 06/12/18 16:54


   Last Admin: 06/12/18 17:04 Dose:  3 ml


Albuterol/Ipratropium (Duoneb 3.0-0.5 Mg/3 Ml)  3 ml NEB Q4H PRN


   PRN Reason: Shortness Of Breath/wheezing


   Last Admin: 06/13/18 08:20 Dose:  3 ml


Albuterol/Ipratropium (Duoneb 3.0-0.5 Mg/3 Ml)  3 ml NEB QID [facility]


Albuterol/Ipratropium (Duoneb 3.0-0.5 Mg/3 Ml)  3 ml NEB QIDRT [facility]


Bupropion HCl (Wellbutrin Sr)  100 mg PO BID ONE


   Stop: 06/13/18 15:14


   Last Admin: 06/13/18 16:26 Dose:  100 mg


Bupropion HCl (Wellbutrin Sr)  100 mg PO BID [facility]


Levofloxacin/Dextrose 750 mg/ (Premix)  150 mls @ 100 mls/hr IV ONETIME ONE


   Stop: 06/12/18 16:25


   Last Admin: 06/12/18 15:40 Dose:  100 mls/hr


Azithromycin 500 mg/ Sodium (Chloride)  250 mls @ 250 mls/hr IV ONETIME ONE


   Stop: 06/13/18 09:59


Azithromycin 250 mg/ Sodium (Chloride)  250 mls @ 250 mls/hr IV Q24H [facility]


   Stop: 06/16/18 19:31


Ceftriaxone Sodium 1 gm/ (Sodium Chloride)  100 mls @ 200 mls/hr IV DAILY [facility]


   Stop: 06/18/18 09:01


   Last Admin: 06/14/18 09:56 Dose:  200 mls/hr


Azithromycin 500 mg/ Sodium (Chloride)  250 mls @ 250 mls/hr IV Q24H [facility]


   Stop: 06/15/18 09:59


   Last Admin: 06/15/18 08:54 Dose:  250 mls/hr


Azithromycin 500 mg/ Sodium (Chloride)  250 mls @ 250 mls/hr IV ONETIME ONE


   Stop: 06/13/18 09:59


   Last Admin: 06/13/18 09:35 Dose:  250 mls/hr


Ipratropium Bromide (Atrovent)  0.5 mg NEB QID [facility]


Metoprolol Tartrate (Lopressor)  50 mg PO BID [facility]


   Last Admin: 06/13/18 09:34 Dose:  50 mg


Non-Formulary Medication (Melatonin)  3 mg PO BEDTIME [facility]


   Last Admin: 06/13/18 19:51 Dose:  Not Given


Potassium Chloride (Klor-Con M20)  20 meq PO BIDMEALS [facility]


   Last Admin: 06/15/18 16:25 Dose:  20 meq


Prednisone (Prednisone)  40 mg PO DAILY [facility]


   Stop: 06/17/18 19:16


   Last Admin: 06/13/18 12:13 Dose:  Not Given


Warfarin Sodium (Coumadin)  1 mg PO ONETIME ONE


   Stop: 06/12/18 19:31


   Last Admin: 06/12/18 20:55 Dose:  1 mg


Warfarin Sodium (Coumadin)  2 mg PO ONETIME ONE


   Stop: 06/13/18 18:01


   Last Admin: 06/13/18 18:08 Dose:  2 mg


Warfarin Sodium (Coumadin)  2 mg PO SuMoWeThFrSa [facility]


   Last Admin: 06/14/18 18:20 Dose:  2 mg


Warfarin Sodium (Coumadin)  2 mg PO SuMoWeThFrSa@1800 [facility]


Warfarin Sodium (Coumadin Sliding Scale)  0 each PO QPM [facility]


   Stop: 06/15/18 21:00


   Last Admin: 06/15/18 18:55 Dose:  Not Given


Warfarin Sodium (Coumadin Sliding Scale)  0 each PO QPM [facility]


   Stop: 06/16/18 21:00


   Last Admin: 06/16/18 18:04 Dose:  Not Given











- Exam


Quality Assessment: Supplemental Oxygen, DVT Prophylaxis


General: Alert, Oriented, Cooperative, No Acute Distress


HEENT: Pupils Equal, Pupils Reactive, EOMI


Neck: Trachea Midline, No JVD


Lungs: Clear to Auscultation, Normal Respiratory Effort


Cardiovascular: Regular Rate, Regular Rhythm


GI/Abdominal Exam: Normal Bowel Sounds, Soft, Non-Tender, No Organomegaly, No 

Distention


 (Female) Exam: Deferred


Back Exam: Normal Inspection


Extremities: Normal Inspection, Normal Range of Motion, Normal Capillary Refill


Skin: Warm, Dry, Intact


Neurological: No New Focal Deficit, Normal Gait, Normal Speech


Psy/Mental Status: Alert, Depressed





- Problem List Review


Problem List Initiated/Reviewed/Updated: Yes





- My Orders


Last 24 Hours: 


My Active Orders





06/17/18 10:15


guaiFENesin [Mucinex]   600 mg PO BID 





06/17/18 18:00


Warfarin [Coumadin]   2 mg PO SuMoWeThFrSa@1800 





06/17/18 21:00


Dextromethorphan/guaiFENesin [Robitussin DM]   10 ml PO BID 





06/19/18 18:00


Warfarin [Coumadin]   1 mg PO Tu 














- Plan


Plan:: 


I/P: 





Acute:





Community Acquired Pneumonia, viral -Improving


   - Metapneumovirus POSITIVE 


   - Risk Factors: COPD, elderly, previous hospitalization for pneumonia 


   - Pneumovax 6/2009, Prevnar 8/2015


   - CURB 65 Score is 2 points:  6.8-30% mortality with recommendation to 

consider inpatient 


   - PSI/PORT Score is 107:  8.2-9.3% mortality with hospitalization 

recommended based on risk  


   - She does not meet criteria for Sepsis/SIRS at this time 


   - ED CXR 6/12/18 initial report--> possible right sided PNA


   - Afebrile and no leukocytosis --> does take Tylenol daily which could be 

affecting her temperature 


   - CRP 9.6--> 3.1


   - Sputum Cx, Respiratory Viral Panel ordered


   - Mycoplasma pneumoniae and Strep pneumoniae negative 


   - SLP consult--> no dysphagia; ruled out aspiration PNA


   - IV Levaquin 750 mg IV daily given in ED--> D/C


   - Azithromycin 500 mg IV x 3 days and Rocephin 1 gm IV QDaily x5 days--> D/C 

Rocephin since viral, D/C Azithomycin today


      -Patient reports rash with PCN previously but states that she has had 

PCNs since that episode and has not had any reactions 


   - Solumedrol 125mg Q8h--> taper to 80mg tomorrow 


   - Decongestant and Expectorant Q4H PRN 


   - Supplemental O2 and breathing treatments PRN


   - Serial CXR as indicated and RT/IS/DuoNebs/Acapella as directed 


   - Recommend outpatient PFTs 





Heart Failure, preserved ejection fraction


   - BNP 3935 


   - Continue home meds--> Benazepril 40 mg daily, furosemide 40 mg daily, 

metoprolol tartrate 50 mg BID


   - 2D Echo-->LVEF 55%, Severe biatrial dilation, mild mitral and tricuspid 

regurg, mild to mod aortic valve regurg


   - Heart Healthy diet 


   - Dietitian consult 


   - F/U with PCP and cardiologist 





Weakness


   - exacerbated by current illness


   - uses a walker and cane at home but states she is usually independent


   - PT/OT consult--> recommend home health





Resolved:


Subtherapeutic INR, acute on chronic


   - hx of afib


   - Pt states she has been subtherapeutic chronically


   - INR 1.89--> 2.2--> 2.86


   - she is on warfarin 1 mg q Tuesday and 2 mg daily 


   - Pharmacy to adjust warfarin 


   - May want to consider starting Eliquis or Xaralto; F/U with PCP





 


Chronic: 


Afib--> Lopressor increased from 50mg BID to TID


COPD


CHF


Rheumatic fever as toddler 


Hx/o Pneumonia in the past


Glaucoma, Cataracts


Recurrent UTI --> recent completion of 7 day treatment with last dose of Cipro 

yesterday, UA negative here


Hypothyroidism


OA


Rectal cancer with surgical intervention for which she now has a colostomy





Plan: 


Taper Solumedrol-->80 mg BID-->80 mg once before DC-->slow Prednisone taper 

start with 30 mg -->taper over 3 weeks.


Admitted from ED to Med/Surg with Tele


Other orders as indicated above 


CM/SW for discharge planning 


Routine AM labs 


Will order UA with hx of recurrent UTIs and recent completion of treatment 


Continue home meds 


Heart healthy diet  


Consult dietitian 


Consult PT/OT 


Consult SLP 


DVT Prophylaxis: She is on warfarin at home and also wears KURT hose; will 

continue these inpatient with SCDs


Ambulate as tolerated 


Code Status: CPR only  


PCP: Dr. Nereyda Rodriguez in [place], [place] 


Most likely D/C Monday with Home Health pending clinical disposition- would 

like to D/C when off of oxygen. Plan is for [place] next Thursday.








**LOS >96 hours due to slow response to treatment.

## 2018-06-18 RX ADMIN — Medication SCH EACH: at 10:03

## 2018-06-18 RX ADMIN — HYDRALAZINE HYDROCHLORIDE PRN MG: 20 INJECTION INTRAMUSCULAR; INTRAVENOUS at 04:57

## 2018-06-18 RX ADMIN — IPRATROPIUM BROMIDE SCH MG: 0.5 SOLUTION RESPIRATORY (INHALATION) at 06:22

## 2018-06-18 RX ADMIN — IPRATROPIUM BROMIDE SCH MG: 0.5 SOLUTION RESPIRATORY (INHALATION) at 15:45

## 2018-06-18 RX ADMIN — Medication SCH MG: at 09:11

## 2018-06-18 RX ADMIN — GUAIFENESIN AND DEXTROMETHORPHAN SCH ML: 100; 10 SYRUP ORAL at 09:18

## 2018-06-18 RX ADMIN — METHYLPREDNISOLONE SODIUM SUCCINATE SCH MG: 125 INJECTION, POWDER, FOR SOLUTION INTRAMUSCULAR; INTRAVENOUS at 09:21

## 2018-06-18 RX ADMIN — IPRATROPIUM BROMIDE SCH MG: 0.5 SOLUTION RESPIRATORY (INHALATION) at 10:03

## 2018-06-18 RX ADMIN — POTASSIUM CHLORIDE SCH MEQ: 1500 TABLET, EXTENDED RELEASE ORAL at 17:06

## 2018-06-18 RX ADMIN — Medication SCH TAB: at 09:13

## 2018-06-18 RX ADMIN — GUAIFENESIN AND DEXTROMETHORPHAN SCH ML: 100; 10 SYRUP ORAL at 21:43

## 2018-06-18 RX ADMIN — POTASSIUM CHLORIDE SCH MEQ: 1500 TABLET, EXTENDED RELEASE ORAL at 09:15

## 2018-06-18 RX ADMIN — IPRATROPIUM BROMIDE SCH MG: 0.5 SOLUTION RESPIRATORY (INHALATION) at 20:29

## 2018-06-18 NOTE — CR
Chest: 2 views of the chest were obtained.

 

Comparison: Prior chest x-ray of 06/14/18.

 

Improved parenchymal density noted within the left base.  Minimal 

density is seen on current chest x-ray compatible slight atelectasis. 

 Left hemidiaphragm is elevated which is a chronic finding.  No acute 

parenchymal densities are seen.  Heart size appears within normal 

limits.  Atherosclerotic change and mild tortuosity of the thoracic 

aorta is seen.

 

Impression:

1.  Improved parenchymal density within the left base with only 

minimal atelectasis remaining.

2.  Other incidental findings.

 

Diagnostic code #2

## 2018-06-19 RX ADMIN — IPRATROPIUM BROMIDE SCH MG: 0.5 SOLUTION RESPIRATORY (INHALATION) at 09:28

## 2018-06-19 RX ADMIN — GUAIFENESIN AND DEXTROMETHORPHAN SCH ML: 100; 10 SYRUP ORAL at 08:28

## 2018-06-19 RX ADMIN — Medication SCH MG: at 08:25

## 2018-06-19 RX ADMIN — HYDRALAZINE HYDROCHLORIDE PRN MG: 20 INJECTION INTRAMUSCULAR; INTRAVENOUS at 05:17

## 2018-06-19 RX ADMIN — POTASSIUM CHLORIDE SCH MEQ: 1500 TABLET, EXTENDED RELEASE ORAL at 08:27

## 2018-06-19 RX ADMIN — Medication SCH TAB: at 08:26

## 2018-06-19 RX ADMIN — Medication SCH EACH: at 09:29

## 2018-06-19 RX ADMIN — IPRATROPIUM BROMIDE SCH MG: 0.5 SOLUTION RESPIRATORY (INHALATION) at 05:07

## 2018-06-19 NOTE — [id number]
**Discharge Summary





- Hospital Course


HPI Initial Comments: 


This is an 88 y/o female with PMHx significant for chronic afib, COPD, CHF, 

rheumatic fever as toddler, pneumonia in the past, glaucoma, recurrent UTI, 

hypothyroidism, OA, and rectal cancer with surgical intervention for which she 

now has a colostomy who comes in for worsening cough that is non productive and 

shortness of breath for the past 2 days. She was admitted from the ED. Patient 

reports subjective fever, chills, shortness of breath, wheezing, cough, chronic 

peripheral edema, difficulty walking, and weakness. She denies chest pain, 

throat pain, sputum production, abdominal pain, N/V, constipation, diarrhea, 

headache. 





Her work-up in the ED showed CBC remarkable for platelet count 151. CMP 

remarkable for Cl 108, anion gap 16.1, BUN 20, Cr 1.2, eGFR 42. CRP elevated at 

9.4. ABG performed showed pCO2 32.3, pO2 53.0, HCO3 19.7. BNP was 3935. ED also 

ordered BC x 2 which are pending. CXR performed in ED showed right lower base 

infiltrate and possible infiltrate vs atelectasis in the left base as well as 

mild pulmonary congestion. Levaquin 750 mg IV initiated in ED. 





Patient reports that she has been feeling short of breath and coughing for the 

past 2 days. Daughter is present with patient and reports that they have tried 

Vicks rub, tumeric and ewa tea with honey with no relief. They have not 

tried OTC products. Patient has been using 2 pillows at night but denies any 

PND. Daughter reports that patient has been hospitalized for pneumonia in the 

past but this has been greater than 1 year ago. Patient states she has b/l 

pedal edema but wears KURT hose and this is well controlled. Patient has a nurse 

that comes to her home 2 times per week to take her BP and O2 sat. Patient 

states that her usual O2 sat is 99%. She does not use home O2. Daughter reports 

that patient is independent at home in [place], but she does use a walker 

and cane. 





Daughter also reports that patient just finished treatment for a UTI recently. 

Daughter reports she was treated with cipro and yesterday was the last dose of 

this. 





She is subsequently admitted to the Med/Surg with Tele. She is a former smoker 

and was exposed to secondhand smoke. Code status is CPR only. PCP is Dr. Nereyda Rodriguez in [place], [place].  





Diagnosis: Stroke: No





- Discharge Data


Discharge Date: 06/19/18 (Admit date: 6/12/18)


Discharge Disposition: Home, [facility]


Condition: Good





- Discharge Diagnosis/Problem(s)


(1) Acute hypoxemic respiratory failure


SNOMED Code(s): 426413969


   ICD Code: J96.01 - ACUTE RESPIRATORY FAILURE WITH HYPOXIA   Status: Acute   

Priority: High   Current Visit: Yes   





(2) Afib


SNOMED Code(s): 21834005


   ICD Code: I48.91 - UNSPECIFIED ATRIAL FIBRILLATION   Status: Acute   Priority

: High   Current Visit: Yes   


Qualifiers: 


   Atrial fibrillation type: unspecified   Qualified Code(s): I48.91 - 

Unspecified atrial fibrillation   





(3) Pneumonia


SNOMED Code(s): 346749395


   ICD Code: J18.9 - PNEUMONIA, UNSPECIFIED ORGANISM   Status: Acute   Priority

: High   Current Visit: Yes   


Qualifiers: 


   Pneumonia type: due to unspecified organism   Laterality: right   Lung 

location: lower lobe of lung   Qualified Code(s): J18.1 - Lobar pneumonia, 

unspecified organism   





(4) Subtherapeutic international normalized ratio (INR)


SNOMED Code(s): 514110413, 043496417


   ICD Code: R79.1 - ABNORMAL COAGULATION PROFILE   Status: Acute   Priority: 

High   Current Visit: Yes   





(5) Weakness


SNOMED Code(s): 11580275


   ICD Code: R53.1 - WEAKNESS   Status: Acute   Priority: Medium   Current Visit

: Yes   





(6) COPD (chronic obstructive pulmonary disease)


SNOMED Code(s): 78605076


   ICD Code: J44.9 - CHRONIC OBSTRUCTIVE PULMONARY DISEASE, UNSPECIFIED   Status

: Chronic   Priority: Medium   Current Visit: Yes   


Qualifiers: 


   COPD type: emphysema   Emphysema type: unspecified   Qualified Code(s): 

J43.9 - Emphysema, unspecified   





(7) Heart failure with preserved ejection fraction


SNOMED Code(s): 81021835


   ICD Code: I50.30 - UNSPECIFIED DIASTOLIC (CONGESTIVE) HEART FAILURE   Status

: Chronic   Priority: Medium   Current Visit: Yes   





(8) Hypothyroid


SNOMED Code(s): 74235984


   ICD Code: E03.9 - HYPOTHYROIDISM, UNSPECIFIED   Status: Chronic   Priority: 

Low   Current Visit: Yes   


Qualifiers: 


   Hypothyroidism type: unspecified   Qualified Code(s): E03.9 - Hypothyroidism

, unspecified   





- Patient Summary/Data


Consults: 


 Consultations





06/12/18 18:18


SLP Evaluation and Treatment [CONS] Routine 





06/12/18 18:24


Consult to Dietician [CONS] Routine 


Consult to Spiritual Care [CONS] Routine 


OT Evaluation and Treatment [CONS] Routine 


PT Evaluation and Treatment [CONS] Routine 


Respiratory Care Assess and Treatment [CONS] Routine 





06/13/18 00:13


Consult to  [CONS] Routine 











Labs Pending at D/C: 


None





Recommended Follow-up Testing/Procedures: 


Follow-up with PCP within 7-10 days after discharge, sooner if needed.





Her BP has been high while here. She has been instructed to take this TID and 

record it. Her medications may need to be adjusted once symptoms resolve and 

steroid stops.





Hospital Course: 


I/P: 





Acute:





Community Acquired Pneumonia, viral -Improving


   - Metapneumovirus POSITIVE 


   - Risk Factors: COPD, elderly, previous hospitalization for pneumonia 


   - Pneumovax 6/2009, Prevnar 8/2015


   - CURB 65 Score is 2 points:  6.8-30% mortality with recommendation to 

consider inpatient 


   - PSI/PORT Score is 107:  8.2-9.3% mortality with hospitalization 

recommended based on risk  


   - She does not meet criteria for Sepsis/SIRS at this time 


   - ED CXR 6/12/18 initial report--> possible right sided PNA


   - Afebrile and no leukocytosis --> does take Tylenol daily which could be 

affecting her temperature 


   - CRP 9.6--> 3.1


   - Sputum Cx, Respiratory Viral Panel ordered


   - Mycoplasma pneumoniae and Strep pneumoniae negative 


   - SLP consult--> no dysphagia; ruled out aspiration PNA


   - IV Levaquin 750 mg IV daily given in ED--> D/C


   - Azithromycin 500 mg IV x 3 days and Rocephin 1 gm IV QDaily x5 days--> D/C 

Rocephin since viral, D/C Azithomycin today


      -Patient reports rash with PCN previously but states that she has had 

PCNs since that episode and has not had any reactions 


   - Solumedrol 125mg Q8h--> taper to 80mg tomorrow --> start PO 3 week taper 

on discharge


   - Decongestant and Expectorant 


   - Supplemental O2 and breathing treatments PRN


   - Serial CXR as indicated and RT/IS/DuoNebs/Acapella as directed 


   - Recommend outpatient PFTs 


   - Repeat CXR on 6/18/18 - improved parenchymal density within left lung base 

with minimal atelectasis remaining 





Heart Failure, preserved ejection fraction


   - BNP 3935 


   - Continue home meds--> Benazepril 40 mg daily, furosemide 40 mg daily, 

metoprolol tartrate 50 mg TID


   - 2D Echo-->LVEF 55%, Severe biatrial dilation, mild mitral and tricuspid 

regurg, mild to mod aortic valve regurg


   - Heart Healthy diet 


   - Dietitian consult 


   - F/U with PCP and cardiologist 





Weakness, improving 


   - exacerbated by current illness


   - uses a walker and cane at home but states she is usually independent


   - PT/OT consult--> recommend home health





Resolved:


Subtherapeutic INR, acute on chronic


   - hx of afib


   - Pt states she has been subtherapeutic chronically


   - INR 1.89--> 2.2--> 2.86


   - she is on warfarin 1 mg q Tuesday and 2 mg daily 


   - Pharmacy to adjust warfarin - continued home dose


   - May want to consider starting Eliquis or Xaralto; F/U with PCP





 


Chronic: 


Afib--> Lopressor increased from 50mg BID to TID


COPD


CHF


Rheumatic fever as toddler 


Hx/o Pneumonia in the past


Glaucoma, Cataracts


Recurrent UTI --> recent completion of 7 day treatment with last dose of Cipro 

yesterday, UA negative here


Hypothyroidism


OA


Rectal cancer with surgical intervention for which she now has a colostomy





Plan: 


Taper Solumedrol-->80 mg BID-->80 mg once before DC-->slow Prednisone taper 

start with 30 mg -->taper over 3 weeks.


Admitted from ED to Med/Surg with Tele


Other orders as indicated above 


CM/SW for discharge planning 


Routine AM labs 


Will order UA with hx of recurrent UTIs and recent completion of treatment 


Continue home meds 


Heart healthy diet  


Consult dietitian 


Consult PT/OT 


Consult SLP 


DVT Prophylaxis: She is on warfarin at home and also wears KURT hose; will 

continue these inpatient with SCDs


Ambulate as tolerated 


Code Status: CPR only  


PCP: Dr. Nereyda Rodriguez in [place], [place]; Dr. Butler 


Most likely D/C Monday with Home Health pending clinical disposition- would 

like to D/C when off of oxygen.








**LOS >96 hours due to slow response to treatment. 


Overall Kiesha did ok. She continues to require a small amount of oxygen and 

has a dry cough. She was positive for human metapneumovirus. Echo was obtained 

as above. She discussed the recent passing of her  and was started on 

wellbutrin. Her home dose of warfarin was continued. Her BP has been in the 140-

150s systolic. This is likely worsened by medications, including steroids. Goal 

is <130 if she can tolerate this. She was instructed to take her BP TID and 

keep it in a journal, bringing this with to all medical appointments. Her 

metoprolol was increased to TID and she was started on losartan at bedtime for 

her hypertension, after discussion with Dr. Song. This will likely need to be 

adjusted by her PCP once symptoms resolve and her medications are completed. 

Her application was rejected by [facility] until she can have 

her strength improved. She was also rejected by local [facility]. Plan is for 

her to be discharged to live with her daughter for assistance and have home 

health services as below. She should then be re-evaluated by the assisted 

living facilities after she regains some strength. She will be discharged today 

with mucinex, Robitussin DM, tessalon perles, and a 3 week prednisone taper for 

her respiratory symptoms. BP meds were adjusted as mentioned, and she was 

started on wellbutrin as before. She was instructed to follow-up with her PCP 

within 7-10 days of discharge, or sooner if needed. She should continue her IS 

and acapella until symptoms resolve. 








I personally met face to face with Kiesha just prior to discharge. She is 

homebound due to her weakness and difficulty with ambulation. She is also 

requiring oxygen supplementation. Due to the above listed conditions I feel she 

would benefit from skilled nursing, PT, OT, and CNA assistance. This can be 

monitored by here PCP, Dr. Butler and adjusted as necessary. 








- Patient Instructions


Diet: Heart Healthy Diet


Activity: As Tolerated


Driving: Do Not Drive


Notify Provider of: Fever, Increased Pain, Swelling and Redness, Nausea and/or 

Vomiting





- Discharge Plan


Prescriptions/Med Rec: 


Benzonatate [Tessalon Perle] 100 mg PO TID #40 capsule


buPROPion [Wellbutrin SR] 100 mg PO BID #30 tab.sr


Dextromethorphan/guaiFENesin [Robitussin DM] 10 ml PO BID 5 Days #1 cup


guaiFENesin [Mucinex] 600 mg PO BID #20 tab.er


Losartan [Cozaar] 25 mg PO BEDTIME #20 tab


Metoprolol Tartrate [Lopressor] 50 mg PO TID #40 tablet


predniSONE [Prednisone] 10 mg PO ASDIRECTED 21 Days #42 tablet


Home Medications: 


 Home Meds





Acetaminophen/Diphenhydramine [Tylenol Pm Ex-Strength Caplet] 500 mg PO BID 06/ 12/18 [History]


Aspirin 81 mg PO DAILY 06/12/18 [History]


Benazepril HCl [Lotensin] 40 mg PO DAILY 06/12/18 [History]


Fish Oil/DHA/EPA [Fish Oil 1,200 MG] 300 mg PO DAILY 06/12/18 [History]


Furosemide [Lasix] 40 mg PO DAILY 06/12/18 [History]


Lactobacillus Acidophilus [Probiotic] 1 each PO DAILY 06/12/18 [History]


Levothyroxine [Synthroid] 88 mcg PO ACBREAKFAST 06/12/18 [History]


Melatonin [Melatin] 3 mg PO BEDTIME 06/12/18 [History]


Mirtazapine 7.5 mg PO DAILY 06/12/18 [History]


Multivitamin W-Minerals/Lutein [Vision Plus Lutein Vitamin] 1 each PO BID 06/12/ 18 [History]


Potassium Chloride 20 meq PO BID 06/12/18 [History]


Tiotropium Br/Olodaterol HCl [Stiolto Respimat Inhal Spray] 4 gm IH DAILY 06/12/ 18 [History]


Warfarin [Coumadin] 1 mg PO TU 06/12/18 [History]


Warfarin [Coumadin] 2 mg PO ASDIRECTED 06/12/18 [History]


amLODIPine Besylate [Amlodipine Besylate] 10 mg PO DAILY 06/12/18 [History]


Benzonatate [Tessalon Perle] 100 mg PO TID #40 capsule 06/19/18 [Rx]


Dextromethorphan/guaiFENesin [Robitussin DM] 10 ml PO BID 5 Days #1 cup 06/19/ 18 [Rx]


Losartan [Cozaar] 25 mg PO BEDTIME #20 tab 06/19/18 [Rx]


Metoprolol Tartrate [Lopressor] 50 mg PO TID #40 tablet 06/19/18 [Rx]


buPROPion [Wellbutrin SR] 100 mg PO BID #30 tab.sr 06/19/18 [Rx]


guaiFENesin [Mucinex] 600 mg PO BID #20 tab.er 06/19/18 [Rx]


predniSONE [Prednisone] 10 mg PO ASDIRECTED 21 Days #42 tablet 06/19/18 [Rx]








Patient Handouts:  Heart Failure, Easy-to-Read


Forms:  ED Department Discharge


Referrals: 


Samuel Butler MD [Physician] -  (Please follow up with your primary 

care provider in 7-10 days. )





- Discharge Summary/Plan Comment


DC Time >30 min.: Yes (45 mins)





- General Info


Date of Service: 06/19/18


Admission Dx/Problem (Free Text: 


 Admission Diagnosis/Problem





Admission Diagnosis/Problem      Pneumonia








Subjective Update: 


In to see Kiesha. She is sitting in the chair. She is doing quite well. 

Reports she slept ok. No current concerns. She still has a dry cough. No 

nursing concerns. She will be discharged today. 





Functional Status: Reports: Pain Controlled, Tolerating Diet, Ambulating, 

Urinating, Incentive Spirometry.  Denies: New Symptoms





- Review of Systems


General: Reports: Weakness (improving ), Fatigue


HEENT: Reports: No Symptoms.  Denies: Glasses, Headaches


Pulmonary: Reports: Cough.  Denies: Shortness of Breath, Pleuritic Chest Pain, 

Sputum, Wheezing


Cardiovascular: Reports: Dyspnea on Exertion.  Denies: Chest Pain, Palpitations

, Edema, Lightheadedness


Gastrointestinal: Denies: Abdominal Pain, Constipation, Decreased Appetite, 

Flatus, Nausea, Vomiting


Genitourinary: Reports: No Symptoms


Musculoskeletal: Reports: No Symptoms


Skin: Reports: No Symptoms


Neurological: Reports: No Symptoms


Psychiatric: Reports: No Symptoms





- Patient Data


Vitals - Most Recent: 


 Last Vital Signs











Temp  97.9 F   06/19/18 07:22


 


Pulse  76   06/19/18 07:22


 


Resp  18   06/19/18 07:22


 


BP  153/68 H  06/19/18 07:22


 


Pulse Ox  88 L  06/19/18 07:22











Weight - Most Recent: 152 lb 12.8 oz


I&O - Last 24 hours: 


 Intake & Output











 06/18/18 06/19/18 06/19/18





 22:59 06:59 14:59


 


Intake Total 560 300 


 


Output Total  900 


 


Balance 560 -600 











Lab Results - Last 24 hrs: 


 Laboratory Results - last 24 hr











  06/19/18 Range/Units





  06:06 


 


PT  23.9 H  (9.5-12.1)  SECONDS


 


INR  2.23  











SERGIO Results - Last 24 hrs: 


 Microbiology











 06/12/18 14:50 Aerobic Blood Culture - Preliminary





 Blood    NO GROWTH AFTER 6 DAYS





 Anaerobic Blood Culture - Preliminary





    NO GROWTH AFTER 6 DAYS


 


 06/12/18 15:04 Aerobic Blood Culture - Preliminary





 Blood    NO GROWTH AFTER 6 DAYS





 Anaerobic Blood Culture - Preliminary





    NO GROWTH AFTER 6 DAYS











Med Orders - Current: 


 Current Medications





Acetaminophen (Tylenol)  650 mg PO Q4H PRN


   PRN Reason: Pain (Mild 1-3)/fever


Amlodipine Besylate (Norvasc)  10 mg PO DAILY [facility]


   Last Admin: 06/18/18 09:19 Dose:  10 mg


Aspirin (Aspirin)  81 mg PO DAILY [facility]


   Last Admin: 06/18/18 09:17 Dose:  81 mg


Benazepril HCl (Lotensin)  40 mg PO DAILY [facility]


   Last Admin: 06/18/18 09:16 Dose:  40 mg


Benzonatate (Tessalon Perles)  100 mg PO TID PRN


   PRN Reason: Cough


   Last Admin: 06/12/18 20:54 Dose:  100 mg


Bisacodyl (Dulcolax)  5 mg PO DAILY PRN


   PRN Reason: Constipation


Bupropion HCl (Wellbutrin Sr)  100 mg PO BID [facility]


   Last Admin: 06/18/18 21:43 Dose:  100 mg


Docusate Sodium (Colace)  100 mg PO BID PRN


   PRN Reason: Constipation


Furosemide (Lasix)  40 mg PO DAILY [facility]


   Last Admin: 06/18/18 09:17 Dose:  40 mg


Guaifenesin (Mucinex)  600 mg PO BID [facility]


   Last Admin: 06/18/18 21:41 Dose:  600 mg


Guaifenesin/Phenylephrine HCl (Robitussin Dm)  5 ml PO Q4H PRN


   PRN Reason: Cough


Guaifenesin/Phenylephrine HCl (Robitussin Dm)  10 ml PO BID [facility]


   Last Admin: 06/18/18 21:43 Dose:  10 ml


Hydralazine HCl (Apresoline)  10 mg IVPUSH Q4H PRN


   PRN Reason: Hypertension


   Last Admin: 06/19/18 05:17 Dose:  10 mg


Hydromorphone HCl (Dilaudid)  0.25 mg IVPUSH Q2H PRN


   PRN Reason: Pain (severe 7-10)


Promethazine HCl 6.25 mg/ (Sodium Chloride)  50.25 mls @ 100 mls/hr IV Q6H PRN


   PRN Reason: Nausea/Vomiting


Ipratropium Bromide (Atrovent)  0.5 mg NEB QIDRT [facility]


   Last Admin: 06/19/18 05:07 Dose:  0.5 mg


Levothyroxine Sodium (Synthroid)  88 mcg PO ACBREAKFAST [facility]


   Last Admin: 06/19/18 05:13 Dose:  88 mcg


Magnesium Hydroxide (Milk Of Magnesia)  30 ml PO Q12H PRN


   PRN Reason: Constipation


Metoprolol Tartrate (Lopressor)  5 mg IVPUSH Q4H PRN


   PRN Reason: Tachycardia


   Last Admin: 06/14/18 10:39 Dose:  5 mg


Metoprolol Tartrate (Lopressor)  50 mg PO TID [facility]


   Last Admin: 06/18/18 21:42 Dose:  50 mg


Mirtazapine (Remeron)  15 mg PO DAILY [facility]


   Last Admin: 06/18/18 09:13 Dose:  15 mg


Oxycodone/Acetaminophen (Percocet 325-5 Mg)  1 tab PO Q4H PRN


   PRN Reason: Pain (moderate 4-6)


Tiotropium Br/Olodaterol Hcl [ Stiolto Respimat Inhal Spra  0 each INH DAILY [facility]


   Last Admin: 06/18/18 10:03 Dose:  1 each


Polyethylene Glycol (Miralax)  17 gm PO DAILY PRN


   PRN Reason: Constipation


Potassium Chloride (Klor-Con M20)  20 meq PO BIDMEALS@0800,1700 [facility]


   Last Admin: 06/18/18 17:06 Dose:  20 meq


Prednisone (Prednisone)  60 mg PO DAILY [facility]


Promethazine HCl (Phenergan)  25 mg PO Q6H PRN


   PRN Reason: Nausea/Vomiting


Saccharomyces Boulardii (Florastor)  250 mg PO DAILY [facility]


   Last Admin: 06/18/18 09:11 Dose:  250 mg


Senna/Docusate Sodium (Senna Plus)  1 tab PO BID PRN


   PRN Reason: Constipation


Sodium Chloride (Saline Flush)  10 ml FLUSH ASDIRECTED PRN


   PRN Reason: Keep Vein Open


   Last Admin: 06/12/18 15:05 Dose:  10 ml


Temazepam (Restoril)  7.5 mg PO BEDTIME PRN


   PRN Reason: Sleep


Vit A/Vit C/Vit E/Selen/Cu/Zn/Lutei (Icaps Mv)  1 tab PO DAILY [facility]


   Last Admin: 06/18/18 09:13 Dose:  1 tab


Warfarin Sodium (Pharmacy To Dose - Warfarin)  1 dose .XX ASDIRECTED [facility]


Warfarin Sodium (Coumadin)  1 mg PO Tu [facility]


Warfarin Sodium (Coumadin)  2 mg PO SuMoWeThFrSa@1800 [facility]


   Last Admin: 06/18/18 17:06 Dose:  2 mg





Discontinued Medications





Albuterol/Ipratropium (Duoneb 3.0-0.5 Mg/3 Ml)  3 ml NEB ONETIME ONE


   Stop: 06/12/18 14:03


   Last Admin: 06/12/18 14:14 Dose:  3 ml


Albuterol/Ipratropium (Duoneb 3.0-0.5 Mg/3 Ml)  3 ml NEB ONETIME ONE


   Stop: 06/12/18 16:54


   Last Admin: 06/12/18 17:04 Dose:  3 ml


Albuterol/Ipratropium (Duoneb 3.0-0.5 Mg/3 Ml)  3 ml NEB Q4H PRN


   PRN Reason: Shortness Of Breath/wheezing


   Last Admin: 06/13/18 08:20 Dose:  3 ml


Albuterol/Ipratropium (Duoneb 3.0-0.5 Mg/3 Ml)  3 ml NEB QID [facility]


Albuterol/Ipratropium (Duoneb 3.0-0.5 Mg/3 Ml)  3 ml NEB QIDRT [facility]


Bupropion HCl (Wellbutrin Sr)  100 mg PO BID ONE


   Stop: 06/13/18 15:14


   Last Admin: 06/13/18 16:26 Dose:  100 mg


Bupropion HCl (Wellbutrin Sr)  100 mg PO BID [facility]


Levofloxacin/Dextrose 750 mg/ (Premix)  150 mls @ 100 mls/hr IV ONETIME ONE


   Stop: 06/12/18 16:25


   Last Admin: 06/12/18 15:40 Dose:  100 mls/hr


Azithromycin 500 mg/ Sodium (Chloride)  250 mls @ 250 mls/hr IV ONETIME ONE


   Stop: 06/13/18 09:59


Azithromycin 250 mg/ Sodium (Chloride)  250 mls @ 250 mls/hr IV Q24H [facility]


   Stop: 06/16/18 19:31


Ceftriaxone Sodium 1 gm/ (Sodium Chloride)  100 mls @ 200 mls/hr IV DAILY [facility]


   Stop: 06/18/18 09:01


   Last Admin: 06/14/18 09:56 Dose:  200 mls/hr


Azithromycin 500 mg/ Sodium (Chloride)  250 mls @ 250 mls/hr IV Q24H [facility]


   Stop: 06/15/18 09:59


   Last Admin: 06/15/18 08:54 Dose:  250 mls/hr


Azithromycin 500 mg/ Sodium (Chloride)  250 mls @ 250 mls/hr IV ONETIME ONE


   Stop: 06/13/18 09:59


   Last Admin: 06/13/18 09:35 Dose:  250 mls/hr


Ipratropium Bromide (Atrovent)  0.5 mg NEB QID [facility]


Methylprednisolone Sodium Succinate (Solu-Medrol)  125 mg IVPUSH Q8H [facility]


   Last Admin: 06/17/18 11:26 Dose:  125 mg


Methylprednisolone Sodium Succinate (Solu-Medrol)  80 mg IVPUSH Q12H [facility]


   Last Admin: 06/18/18 09:21 Dose:  80 mg


Metoprolol Tartrate (Lopressor)  50 mg PO BID [facility]


   Last Admin: 06/13/18 09:34 Dose:  50 mg


Non-Formulary Medication (Melatonin)  3 mg PO BEDTIME [facility]


   Last Admin: 06/13/18 19:51 Dose:  Not Given


Potassium Chloride (Klor-Con M20)  20 meq PO BIDMEALS [facility]


   Last Admin: 06/15/18 16:25 Dose:  20 meq


Prednisone (Prednisone)  40 mg PO DAILY [facility]


   Stop: 06/17/18 19:16


   Last Admin: 06/13/18 12:13 Dose:  Not Given


Warfarin Sodium (Coumadin)  1 mg PO ONETIME ONE


   Stop: 06/12/18 19:31


   Last Admin: 06/12/18 20:55 Dose:  1 mg


Warfarin Sodium (Coumadin)  2 mg PO ONETIME ONE


   Stop: 06/13/18 18:01


   Last Admin: 06/13/18 18:08 Dose:  2 mg


Warfarin Sodium (Coumadin)  2 mg PO SuMoWeThFrSa [facility]


   Last Admin: 06/14/18 18:20 Dose:  2 mg


Warfarin Sodium (Coumadin)  2 mg PO SuMoWeThFrSa@1800 [facility]


Warfarin Sodium (Coumadin Sliding Scale)  0 each PO QPM [facility]


   Stop: 06/15/18 21:00


   Last Admin: 06/15/18 18:55 Dose:  Not Given


Warfarin Sodium (Coumadin Sliding Scale)  0 each PO QPM [facility]


   Stop: 06/16/18 21:00


   Last Admin: 06/16/18 18:04 Dose:  Not Given











- Exam


Quality Assessment: Reports: Supplemental Oxygen, DVT Prophylaxis


General: Reports: Alert, Oriented, Cooperative, No Acute Distress


HEENT: Reports: Pupils Equal, Pupils Reactive, EOMI, Mucous Membr. Moist/Pink


Neck: Reports: Supple, Trachea Midline, No JVD


Lungs: Reports: Clear to Auscultation, Normal Respiratory Effort.  Denies: 

Rhonchi, Wheezing


Cardiovascular: Reports: Regular Rate, Regular Rhythm


GI/Abdominal Exam: Normal Bowel Sounds, Soft, Non-Tender, No Distention


 (Female) Exam: Deferred


Rectal (Female) Exam: Deferred


Back Exam: Reports: Normal Inspection, Full Range of Motion


Extremities: Normal Inspection, Normal Range of Motion, Non-Tender, No Pedal 

Edema, Normal Capillary Refill


Skin: Reports: Warm, Dry, Intact


Neurological: Reports: No New Focal Deficit


Psy/Mental Status: Reports: Alert, Normal Affect, Normal Mood